# Patient Record
Sex: FEMALE | Race: WHITE | NOT HISPANIC OR LATINO
[De-identification: names, ages, dates, MRNs, and addresses within clinical notes are randomized per-mention and may not be internally consistent; named-entity substitution may affect disease eponyms.]

---

## 2017-08-02 ENCOUNTER — APPOINTMENT (OUTPATIENT)
Dept: CARDIOLOGY | Facility: CLINIC | Age: 67
End: 2017-08-02
Payer: MEDICARE

## 2017-08-02 ENCOUNTER — NON-APPOINTMENT (OUTPATIENT)
Age: 67
End: 2017-08-02

## 2017-08-02 VITALS
SYSTOLIC BLOOD PRESSURE: 145 MMHG | HEIGHT: 66.5 IN | BODY MASS INDEX: 33.35 KG/M2 | DIASTOLIC BLOOD PRESSURE: 80 MMHG | OXYGEN SATURATION: 98 % | HEART RATE: 74 BPM | WEIGHT: 210 LBS

## 2017-08-02 DIAGNOSIS — I65.29 OCCLUSION AND STENOSIS OF UNSPECIFIED CAROTID ARTERY: ICD-10-CM

## 2017-08-02 PROCEDURE — 93000 ELECTROCARDIOGRAM COMPLETE: CPT

## 2017-08-02 PROCEDURE — 99205 OFFICE O/P NEW HI 60 MIN: CPT | Mod: 25

## 2017-08-11 PROBLEM — I65.29 CAROTID STENOSIS: Status: ACTIVE | Noted: 2017-08-11

## 2017-08-13 ENCOUNTER — TRANSCRIPTION ENCOUNTER (OUTPATIENT)
Age: 67
End: 2017-08-13

## 2017-08-16 ENCOUNTER — NON-APPOINTMENT (OUTPATIENT)
Age: 67
End: 2017-08-16

## 2017-08-16 ENCOUNTER — APPOINTMENT (OUTPATIENT)
Dept: CARDIOLOGY | Facility: CLINIC | Age: 67
End: 2017-08-16
Payer: MEDICARE

## 2017-08-16 ENCOUNTER — APPOINTMENT (OUTPATIENT)
Dept: FAMILY MEDICINE | Facility: CLINIC | Age: 67
End: 2017-08-16
Payer: MEDICARE

## 2017-08-16 VITALS
WEIGHT: 211.5 LBS | BODY MASS INDEX: 33.19 KG/M2 | DIASTOLIC BLOOD PRESSURE: 70 MMHG | SYSTOLIC BLOOD PRESSURE: 130 MMHG | HEIGHT: 67 IN | OXYGEN SATURATION: 98 % | HEART RATE: 76 BPM

## 2017-08-16 DIAGNOSIS — Z82.49 FAMILY HISTORY OF ISCHEMIC HEART DISEASE AND OTHER DISEASES OF THE CIRCULATORY SYSTEM: ICD-10-CM

## 2017-08-16 DIAGNOSIS — Z87.891 PERSONAL HISTORY OF NICOTINE DEPENDENCE: ICD-10-CM

## 2017-08-16 DIAGNOSIS — Z83.3 FAMILY HISTORY OF DIABETES MELLITUS: ICD-10-CM

## 2017-08-16 DIAGNOSIS — Z82.69 FAMILY HISTORY OF OTHER DISEASES OF THE MUSCULOSKELETAL SYSTEM AND CONNECTIVE TISSUE: ICD-10-CM

## 2017-08-16 DIAGNOSIS — Z82.3 FAMILY HISTORY OF STROKE: ICD-10-CM

## 2017-08-16 DIAGNOSIS — Z78.9 OTHER SPECIFIED HEALTH STATUS: ICD-10-CM

## 2017-08-16 PROCEDURE — 99204 OFFICE O/P NEW MOD 45 MIN: CPT | Mod: 25

## 2017-08-16 PROCEDURE — 36415 COLL VENOUS BLD VENIPUNCTURE: CPT

## 2017-08-16 PROCEDURE — 93306 TTE W/DOPPLER COMPLETE: CPT

## 2017-08-16 RX ORDER — ASCORBIC ACID 500 MG
TABLET ORAL
Refills: 0 | Status: ACTIVE | COMMUNITY

## 2017-08-16 RX ORDER — CLOPIDOGREL BISULFATE 75 MG/1
75 TABLET, FILM COATED ORAL DAILY
Refills: 0 | Status: DISCONTINUED | COMMUNITY
End: 2017-08-16

## 2017-08-16 RX ORDER — ASPIRIN 81 MG
81 TABLET, DELAYED RELEASE (ENTERIC COATED) ORAL
Refills: 0 | Status: ACTIVE | COMMUNITY

## 2017-08-16 RX ORDER — ADHESIVE TAPE 3"X 2.3 YD
50 MCG TAPE, NON-MEDICATED TOPICAL
Refills: 0 | Status: ACTIVE | COMMUNITY

## 2017-08-16 RX ORDER — GLUC/MSM/COLGN2/HYAL/ANTIARTH3 375-375-20
TABLET ORAL
Refills: 0 | Status: ACTIVE | COMMUNITY

## 2017-08-18 LAB
25(OH)D3 SERPL-MCNC: 31.1 NG/ML
ALBUMIN SERPL ELPH-MCNC: 4.4 G/DL
ALP BLD-CCNC: 73 U/L
ALT SERPL-CCNC: 14 U/L
ANION GAP SERPL CALC-SCNC: 17 MMOL/L
APPEARANCE: ABNORMAL
AST SERPL-CCNC: 20 U/L
BACTERIA: NEGATIVE
BASOPHILS # BLD AUTO: 0.01 K/UL
BASOPHILS NFR BLD AUTO: 0.1 %
BILIRUB SERPL-MCNC: 0.4 MG/DL
BILIRUBIN URINE: NEGATIVE
BLOOD URINE: NEGATIVE
BUN SERPL-MCNC: 36 MG/DL
CALCIUM SERPL-MCNC: 9.8 MG/DL
CHLORIDE SERPL-SCNC: 102 MMOL/L
CHOLEST SERPL-MCNC: 145 MG/DL
CHOLEST/HDLC SERPL: 3.4 RATIO
CO2 SERPL-SCNC: 24 MMOL/L
COLOR: YELLOW
CREAT SERPL-MCNC: 1.24 MG/DL
CREAT SPEC-SCNC: 88 MG/DL
EOSINOPHIL # BLD AUTO: 0.06 K/UL
EOSINOPHIL NFR BLD AUTO: 0.8 %
FOLATE SERPL-MCNC: 15.3 NG/ML
GLUCOSE QUALITATIVE U: NORMAL MG/DL
GLUCOSE SERPL-MCNC: 93 MG/DL
HBA1C MFR BLD HPLC: 5.7 %
HCT VFR BLD CALC: 34.1 %
HDLC SERPL-MCNC: 43 MG/DL
HGB BLD-MCNC: 11.1 G/DL
HYALINE CASTS: 1 /LPF
IMM GRANULOCYTES NFR BLD AUTO: 0.1 %
IRON SATN MFR SERPL: 18 %
IRON SERPL-MCNC: 56 UG/DL
KETONES URINE: NEGATIVE
LDLC SERPL CALC-MCNC: 59 MG/DL
LEUKOCYTE ESTERASE URINE: NEGATIVE
LYMPHOCYTES # BLD AUTO: 2.55 K/UL
LYMPHOCYTES NFR BLD AUTO: 34.4 %
MAN DIFF?: NORMAL
MCHC RBC-ENTMCNC: 29.5 PG
MCHC RBC-ENTMCNC: 32.6 GM/DL
MCV RBC AUTO: 90.7 FL
MICROALBUMIN 24H UR DL<=1MG/L-MCNC: 3 MG/DL
MICROALBUMIN/CREAT 24H UR-RTO: 34 MG/G
MICROSCOPIC-UA: NORMAL
MONOCYTES # BLD AUTO: 0.47 K/UL
MONOCYTES NFR BLD AUTO: 6.3 %
NEUTROPHILS # BLD AUTO: 4.31 K/UL
NEUTROPHILS NFR BLD AUTO: 58.3 %
NITRITE URINE: NEGATIVE
PH URINE: 5
PLATELET # BLD AUTO: 251 K/UL
POTASSIUM SERPL-SCNC: 5.2 MMOL/L
PROT SERPL-MCNC: 8.2 G/DL
PROTEIN URINE: NEGATIVE MG/DL
RBC # BLD: 3.76 M/UL
RBC # FLD: 12.9 %
RED BLOOD CELLS URINE: 1 /HPF
SODIUM SERPL-SCNC: 143 MMOL/L
SPECIFIC GRAVITY URINE: 1.02
SQUAMOUS EPITHELIAL CELLS: 7 /HPF
T4 FREE SERPL-MCNC: 1.2 NG/DL
TIBC SERPL-MCNC: 304 UG/DL
TRIGL SERPL-MCNC: 215 MG/DL
TSH SERPL-ACNC: 2.69 UIU/ML
UIBC SERPL-MCNC: 248 UG/DL
URATE SERPL-MCNC: 8.1 MG/DL
UROBILINOGEN URINE: NORMAL MG/DL
VIT B12 SERPL-MCNC: 382 PG/ML
WBC # FLD AUTO: 7.41 K/UL
WHITE BLOOD CELLS URINE: 4 /HPF

## 2017-09-11 ENCOUNTER — TRANSCRIPTION ENCOUNTER (OUTPATIENT)
Age: 67
End: 2017-09-11

## 2017-09-13 ENCOUNTER — TRANSCRIPTION ENCOUNTER (OUTPATIENT)
Age: 67
End: 2017-09-13

## 2017-11-08 ENCOUNTER — APPOINTMENT (OUTPATIENT)
Dept: FAMILY MEDICINE | Facility: CLINIC | Age: 67
End: 2017-11-08
Payer: MEDICARE

## 2017-11-08 VITALS
HEIGHT: 67 IN | HEART RATE: 77 BPM | WEIGHT: 217 LBS | SYSTOLIC BLOOD PRESSURE: 158 MMHG | OXYGEN SATURATION: 99 % | BODY MASS INDEX: 34.06 KG/M2 | DIASTOLIC BLOOD PRESSURE: 82 MMHG

## 2017-11-08 VITALS — DIASTOLIC BLOOD PRESSURE: 64 MMHG | SYSTOLIC BLOOD PRESSURE: 128 MMHG

## 2017-11-08 DIAGNOSIS — M25.50 PAIN IN UNSPECIFIED JOINT: ICD-10-CM

## 2017-11-08 PROCEDURE — G0439: CPT

## 2017-11-08 PROCEDURE — 36415 COLL VENOUS BLD VENIPUNCTURE: CPT

## 2017-11-08 PROCEDURE — G0009: CPT

## 2017-11-08 PROCEDURE — 90732 PPSV23 VACC 2 YRS+ SUBQ/IM: CPT

## 2017-11-10 LAB
ALBUMIN SERPL ELPH-MCNC: 4.5 G/DL
ALP BLD-CCNC: 87 U/L
ALT SERPL-CCNC: 15 U/L
ANION GAP SERPL CALC-SCNC: 16 MMOL/L
AST SERPL-CCNC: 22 U/L
BASOPHILS # BLD AUTO: 0.02 K/UL
BASOPHILS NFR BLD AUTO: 0.2 %
BILIRUB SERPL-MCNC: 0.4 MG/DL
BUN SERPL-MCNC: 41 MG/DL
CALCIUM SERPL-MCNC: 10.1 MG/DL
CHLORIDE SERPL-SCNC: 102 MMOL/L
CHOLEST SERPL-MCNC: 167 MG/DL
CHOLEST/HDLC SERPL: 3.8 RATIO
CO2 SERPL-SCNC: 23 MMOL/L
CREAT SERPL-MCNC: 1.2 MG/DL
EOSINOPHIL # BLD AUTO: 0.03 K/UL
EOSINOPHIL NFR BLD AUTO: 0.4 %
FOLATE SERPL-MCNC: 19.6 NG/ML
GLUCOSE SERPL-MCNC: 101 MG/DL
HBA1C MFR BLD HPLC: 6.1 %
HCT VFR BLD CALC: 36.2 %
HDLC SERPL-MCNC: 44 MG/DL
HGB BLD-MCNC: 11.8 G/DL
IMM GRANULOCYTES NFR BLD AUTO: 0.1 %
LDLC SERPL CALC-MCNC: 76 MG/DL
LYMPHOCYTES # BLD AUTO: 2.31 K/UL
LYMPHOCYTES NFR BLD AUTO: 28 %
MAN DIFF?: NORMAL
MCHC RBC-ENTMCNC: 30.4 PG
MCHC RBC-ENTMCNC: 32.6 GM/DL
MCV RBC AUTO: 93.3 FL
MONOCYTES # BLD AUTO: 0.45 K/UL
MONOCYTES NFR BLD AUTO: 5.5 %
NEUTROPHILS # BLD AUTO: 5.42 K/UL
NEUTROPHILS NFR BLD AUTO: 65.8 %
PLATELET # BLD AUTO: 249 K/UL
POTASSIUM SERPL-SCNC: 5.1 MMOL/L
PROT SERPL-MCNC: 8.3 G/DL
RBC # BLD: 3.88 M/UL
RBC # FLD: 13.7 %
SODIUM SERPL-SCNC: 141 MMOL/L
TRIGL SERPL-MCNC: 233 MG/DL
URATE SERPL-MCNC: 6.9 MG/DL
VIT B12 SERPL-MCNC: 578 PG/ML
WBC # FLD AUTO: 8.24 K/UL

## 2017-11-10 RX ORDER — METFORMIN HYDROCHLORIDE 1000 MG/1
1000 TABLET, EXTENDED RELEASE ORAL DAILY
Qty: 90 | Refills: 0 | Status: DISCONTINUED | COMMUNITY
End: 2017-11-10

## 2017-11-10 RX ORDER — ALLOPURINOL 300 MG/1
300 TABLET ORAL DAILY
Qty: 90 | Refills: 0 | Status: DISCONTINUED | COMMUNITY
Start: 2017-11-08 | End: 2017-11-10

## 2017-11-14 PROBLEM — M25.50 JOINT PAIN: Status: ACTIVE | Noted: 2017-11-14

## 2017-11-20 ENCOUNTER — TRANSCRIPTION ENCOUNTER (OUTPATIENT)
Age: 67
End: 2017-11-20

## 2017-11-21 ENCOUNTER — TRANSCRIPTION ENCOUNTER (OUTPATIENT)
Age: 67
End: 2017-11-21

## 2017-11-28 ENCOUNTER — TRANSCRIPTION ENCOUNTER (OUTPATIENT)
Age: 67
End: 2017-11-28

## 2018-01-11 ENCOUNTER — FORM ENCOUNTER (OUTPATIENT)
Age: 68
End: 2018-01-11

## 2018-01-12 ENCOUNTER — APPOINTMENT (OUTPATIENT)
Dept: ULTRASOUND IMAGING | Facility: CLINIC | Age: 68
End: 2018-01-12
Payer: MEDICARE

## 2018-01-12 ENCOUNTER — APPOINTMENT (OUTPATIENT)
Dept: MAMMOGRAPHY | Facility: CLINIC | Age: 68
End: 2018-01-12
Payer: MEDICARE

## 2018-01-12 ENCOUNTER — OUTPATIENT (OUTPATIENT)
Dept: OUTPATIENT SERVICES | Facility: HOSPITAL | Age: 68
LOS: 1 days | End: 2018-01-12
Payer: MEDICARE

## 2018-01-12 ENCOUNTER — APPOINTMENT (OUTPATIENT)
Dept: RADIOLOGY | Facility: CLINIC | Age: 68
End: 2018-01-12
Payer: MEDICARE

## 2018-01-12 DIAGNOSIS — M54.9 DORSALGIA, UNSPECIFIED: ICD-10-CM

## 2018-01-12 DIAGNOSIS — E04.1 NONTOXIC SINGLE THYROID NODULE: ICD-10-CM

## 2018-01-12 DIAGNOSIS — Z00.8 ENCOUNTER FOR OTHER GENERAL EXAMINATION: ICD-10-CM

## 2018-01-12 DIAGNOSIS — Z12.31 ENCOUNTER FOR SCREENING MAMMOGRAM FOR MALIGNANT NEOPLASM OF BREAST: ICD-10-CM

## 2018-01-12 PROCEDURE — 72070 X-RAY EXAM THORAC SPINE 2VWS: CPT | Mod: 26

## 2018-01-12 PROCEDURE — 76536 US EXAM OF HEAD AND NECK: CPT | Mod: 26

## 2018-01-12 PROCEDURE — 72114 X-RAY EXAM L-S SPINE BENDING: CPT | Mod: 26

## 2018-01-12 PROCEDURE — 77063 BREAST TOMOSYNTHESIS BI: CPT | Mod: 26

## 2018-01-12 PROCEDURE — 77067 SCR MAMMO BI INCL CAD: CPT | Mod: 26

## 2018-01-12 PROCEDURE — 72114 X-RAY EXAM L-S SPINE BENDING: CPT

## 2018-01-12 PROCEDURE — 72070 X-RAY EXAM THORAC SPINE 2VWS: CPT

## 2018-01-12 PROCEDURE — 77063 BREAST TOMOSYNTHESIS BI: CPT

## 2018-01-12 PROCEDURE — 76536 US EXAM OF HEAD AND NECK: CPT

## 2018-01-12 PROCEDURE — 77067 SCR MAMMO BI INCL CAD: CPT

## 2018-01-15 ENCOUNTER — RESULT REVIEW (OUTPATIENT)
Age: 68
End: 2018-01-15

## 2018-02-02 ENCOUNTER — APPOINTMENT (OUTPATIENT)
Dept: CARDIOLOGY | Facility: CLINIC | Age: 68
End: 2018-02-02
Payer: MEDICARE

## 2018-02-02 ENCOUNTER — NON-APPOINTMENT (OUTPATIENT)
Age: 68
End: 2018-02-02

## 2018-02-02 VITALS
HEART RATE: 67 BPM | OXYGEN SATURATION: 99 % | BODY MASS INDEX: 35.16 KG/M2 | WEIGHT: 224 LBS | SYSTOLIC BLOOD PRESSURE: 165 MMHG | DIASTOLIC BLOOD PRESSURE: 81 MMHG | HEIGHT: 67 IN

## 2018-02-02 PROCEDURE — 93000 ELECTROCARDIOGRAM COMPLETE: CPT

## 2018-02-02 PROCEDURE — 99214 OFFICE O/P EST MOD 30 MIN: CPT | Mod: 25

## 2018-02-02 RX ORDER — METFORMIN HYDROCHLORIDE 500 MG/1
500 TABLET, COATED ORAL TWICE DAILY
Qty: 180 | Refills: 1 | Status: DISCONTINUED | COMMUNITY
Start: 2017-11-10 | End: 2018-02-02

## 2018-02-21 ENCOUNTER — APPOINTMENT (OUTPATIENT)
Dept: FAMILY MEDICINE | Facility: CLINIC | Age: 68
End: 2018-02-21
Payer: MEDICARE

## 2018-02-21 VITALS
DIASTOLIC BLOOD PRESSURE: 60 MMHG | OXYGEN SATURATION: 98 % | RESPIRATION RATE: 16 BRPM | SYSTOLIC BLOOD PRESSURE: 120 MMHG | HEART RATE: 77 BPM

## 2018-02-21 VITALS
HEIGHT: 67 IN | SYSTOLIC BLOOD PRESSURE: 142 MMHG | WEIGHT: 224 LBS | BODY MASS INDEX: 35.16 KG/M2 | DIASTOLIC BLOOD PRESSURE: 62 MMHG

## 2018-02-21 PROCEDURE — 99214 OFFICE O/P EST MOD 30 MIN: CPT

## 2018-02-21 RX ORDER — CYANOCOBALAMIN (VITAMIN B-12) 100 MCG
100 TABLET ORAL
Refills: 0 | Status: ACTIVE | COMMUNITY

## 2018-03-02 ENCOUNTER — TRANSCRIPTION ENCOUNTER (OUTPATIENT)
Age: 68
End: 2018-03-02

## 2018-03-06 ENCOUNTER — RX RENEWAL (OUTPATIENT)
Age: 68
End: 2018-03-06

## 2018-03-09 ENCOUNTER — APPOINTMENT (OUTPATIENT)
Dept: FAMILY MEDICINE | Facility: CLINIC | Age: 68
End: 2018-03-09
Payer: MEDICARE

## 2018-03-09 VITALS
BODY MASS INDEX: 34.73 KG/M2 | WEIGHT: 221.25 LBS | DIASTOLIC BLOOD PRESSURE: 78 MMHG | HEIGHT: 67 IN | OXYGEN SATURATION: 98 % | SYSTOLIC BLOOD PRESSURE: 140 MMHG | HEART RATE: 89 BPM | TEMPERATURE: 98.5 F

## 2018-03-09 PROCEDURE — 99214 OFFICE O/P EST MOD 30 MIN: CPT

## 2018-03-13 ENCOUNTER — TRANSCRIPTION ENCOUNTER (OUTPATIENT)
Age: 68
End: 2018-03-13

## 2018-03-14 ENCOUNTER — TRANSCRIPTION ENCOUNTER (OUTPATIENT)
Age: 68
End: 2018-03-14

## 2018-04-09 ENCOUNTER — RX RENEWAL (OUTPATIENT)
Age: 68
End: 2018-04-09

## 2018-04-09 ENCOUNTER — RESULT REVIEW (OUTPATIENT)
Age: 68
End: 2018-04-09

## 2018-04-09 ENCOUNTER — TRANSCRIPTION ENCOUNTER (OUTPATIENT)
Age: 68
End: 2018-04-09

## 2018-04-10 ENCOUNTER — TRANSCRIPTION ENCOUNTER (OUTPATIENT)
Age: 68
End: 2018-04-10

## 2018-05-11 ENCOUNTER — RX RENEWAL (OUTPATIENT)
Age: 68
End: 2018-05-11

## 2018-05-23 ENCOUNTER — APPOINTMENT (OUTPATIENT)
Dept: FAMILY MEDICINE | Facility: CLINIC | Age: 68
End: 2018-05-23
Payer: MEDICARE

## 2018-05-23 VITALS
BODY MASS INDEX: 36.1 KG/M2 | OXYGEN SATURATION: 98 % | HEIGHT: 67 IN | WEIGHT: 230 LBS | HEART RATE: 87 BPM | SYSTOLIC BLOOD PRESSURE: 140 MMHG | DIASTOLIC BLOOD PRESSURE: 70 MMHG

## 2018-05-23 VITALS — SYSTOLIC BLOOD PRESSURE: 130 MMHG | RESPIRATION RATE: 16 BRPM | DIASTOLIC BLOOD PRESSURE: 70 MMHG

## 2018-05-23 DIAGNOSIS — Z87.09 PERSONAL HISTORY OF OTHER DISEASES OF THE RESPIRATORY SYSTEM: ICD-10-CM

## 2018-05-23 DIAGNOSIS — Z12.11 ENCOUNTER FOR SCREENING FOR MALIGNANT NEOPLASM OF COLON: ICD-10-CM

## 2018-05-23 DIAGNOSIS — D64.9 ANEMIA, UNSPECIFIED: ICD-10-CM

## 2018-05-23 PROCEDURE — 36415 COLL VENOUS BLD VENIPUNCTURE: CPT

## 2018-05-23 PROCEDURE — G0444 DEPRESSION SCREEN ANNUAL: CPT | Mod: 59

## 2018-05-23 PROCEDURE — 99214 OFFICE O/P EST MOD 30 MIN: CPT | Mod: 25

## 2018-05-23 RX ORDER — BENZONATATE 200 MG/1
200 CAPSULE ORAL
Qty: 30 | Refills: 0 | Status: DISCONTINUED | COMMUNITY
Start: 2018-03-06 | End: 2018-05-23

## 2018-05-23 RX ORDER — ATORVASTATIN CALCIUM 40 MG/1
40 TABLET, FILM COATED ORAL
Qty: 90 | Refills: 0 | Status: DISCONTINUED | COMMUNITY
Start: 2017-01-13

## 2018-05-23 RX ORDER — AMOXICILLIN AND CLAVULANATE POTASSIUM 875; 125 MG/1; MG/1
875-125 TABLET, COATED ORAL
Qty: 14 | Refills: 0 | Status: DISCONTINUED | COMMUNITY
Start: 2018-03-09 | End: 2018-05-23

## 2018-05-23 NOTE — COUNSELING
[Healthy eating counseling provided] : healthy eating [Activity counseling provided] : activity [Patient motivation] : Patient motivation [Good understanding] : Patient has a good understanding of lifestyle changes and the steps needed to achieve self management goals [ - Annual Depression Screening] : Annual Depression Screening

## 2018-05-23 NOTE — HISTORY OF PRESENT ILLNESS
[FreeTextEntry1] : Follow up [de-identified] : \par 67 year old female presents for follow up. \par \par Has CAD s/p CABG (3 vessel) in 2016, follows with cardiology \par \par PVD s/p LLE stent\par \par Has DM II, last HgA1C 6.1, currently on Metformin 500 mg daily\par Did not do blood work ordered previously, will check today \par sugars in morning running average 140s \par \par HTN, on Metoprolol \par \par Hyperlipidemia, on Atorvastatin \par \par H/o Gout, on Allopurinol \par \par Thyroid ultrasound done Jan 2018 showed multiple benign appearing right thyroid nodules and cysts\par has not seen endocrinology yet \par \par concerned about Obesity, unable to lose weight\par has gone to the gym, karla, didn't lose weight\par noticed in the past when she followed with a nutritionist she was able to lose weight\par typically breakfast is an egg or cereal with milk, fruit, lunch later in the afternoon can be a sandwich, dinner later on may vary, chicken, vegetables, potato, occasionally shrimp, pizza

## 2018-05-23 NOTE — PHYSICAL EXAM
[No Acute Distress] : no acute distress [Well Nourished] : well nourished [Well Developed] : well developed [Normal Oropharynx] : the oropharynx was normal [Normal TMs] : both tympanic membranes were normal [Normal Appearance] : was normal in appearance [Neck Supple] : was supple [No Respiratory Distress] : no respiratory distress  [Clear to Auscultation] : lungs were clear to auscultation bilaterally [Normal Rhythm/Effort] : normal respiratory rhythm and effort [Normal Rate] : normal rate  [Regular Rhythm] : with a regular rhythm [Normal S1, S2] : normal S1 and S2 [No Murmur] : no murmur heard [No Carotid Bruits] : no carotid bruits [No Edema] : there was no peripheral edema [Soft] : abdomen soft [Non Tender] : non-tender [Normal Bowel Sounds] : normal bowel sounds [Normal Anterior Cervical Nodes] : no anterior cervical lymphadenopathy [Alert and Oriented x3] : oriented to person, place, and time [Well-Appearing] : well-appearing [Obese] : obese [Grossly Normal Strength/Tone] : grossly normal strength/tone [No Focal Deficits] : no focal deficits [Normal Affect] : the affect was normal [Normal Mood] : the mood was normal [de-identified] : Obese [de-identified] : no thyromegaly

## 2018-05-23 NOTE — REVIEW OF SYSTEMS
[Fever] : no fever [Chills] : no chills [Sore Throat] : no sore throat [Chest Pain] : no chest pain [Palpitations] : no palpitations [Shortness Of Breath] : no shortness of breath [Cough] : no cough [Abdominal Pain] : no abdominal pain

## 2018-05-23 NOTE — ASSESSMENT
[FreeTextEntry1] : DM II:\par last HgA1C 6.1, check blood work\par c/w Metformin for now, if renal function worsens, will look to switch medication\par focus on diet and exercise as tolerated \par \par HTN:\par stable, c/w Metoprolol \par \par Hyperlipidemia:\par previously stable\par c/w Atorvastatin\par c/w diet and exercise as tolerated \par check blood work \par \par CKD:\par check blood work, reassess \par \par CAD:\par stable, c/w current medications\par f/u with cardiology when due\par \par Thyroid nodules:\par advised to f/u with endocrinology \par \par Obesity:\par c/w diet and exercise as tolerated\par f/u with nutritionist\par see endocrinology\par \par up to date with mammogram\par do stool study (fecal globin), declines colonoscopy \par \par return as needed and follow up in 3 months

## 2018-05-29 LAB
25(OH)D3 SERPL-MCNC: 28.7 NG/ML
ALBUMIN SERPL ELPH-MCNC: 4.7 G/DL
ALP BLD-CCNC: 99 U/L
ALT SERPL-CCNC: 17 U/L
ANION GAP SERPL CALC-SCNC: 15 MMOL/L
APPEARANCE: CLEAR
AST SERPL-CCNC: 22 U/L
BACTERIA: NEGATIVE
BASOPHILS # BLD AUTO: 0.02 K/UL
BASOPHILS NFR BLD AUTO: 0.3 %
BILIRUB SERPL-MCNC: 0.2 MG/DL
BILIRUBIN URINE: NEGATIVE
BLOOD URINE: NEGATIVE
BUN SERPL-MCNC: 33 MG/DL
CALCIUM SERPL-MCNC: 10.1 MG/DL
CHLORIDE SERPL-SCNC: 103 MMOL/L
CHOLEST SERPL-MCNC: 182 MG/DL
CHOLEST/HDLC SERPL: 3.9 RATIO
CO2 SERPL-SCNC: 23 MMOL/L
COLOR: YELLOW
CREAT SERPL-MCNC: 1.1 MG/DL
CREAT SPEC-SCNC: 98 MG/DL
EOSINOPHIL # BLD AUTO: 0.07 K/UL
EOSINOPHIL NFR BLD AUTO: 1 %
FOLATE SERPL-MCNC: 18.9 NG/ML
GLUCOSE QUALITATIVE U: NEGATIVE MG/DL
GLUCOSE SERPL-MCNC: 126 MG/DL
HBA1C MFR BLD HPLC: 6.8 %
HCT VFR BLD CALC: 36.5 %
HDLC SERPL-MCNC: 47 MG/DL
HGB BLD-MCNC: 11.6 G/DL
HYALINE CASTS: 2 /LPF
IMM GRANULOCYTES NFR BLD AUTO: 0.3 %
KETONES URINE: NEGATIVE
LDLC SERPL CALC-MCNC: 82 MG/DL
LEUKOCYTE ESTERASE URINE: NEGATIVE
LYMPHOCYTES # BLD AUTO: 2.42 K/UL
LYMPHOCYTES NFR BLD AUTO: 36.2 %
MAN DIFF?: NORMAL
MCHC RBC-ENTMCNC: 29.9 PG
MCHC RBC-ENTMCNC: 31.8 GM/DL
MCV RBC AUTO: 94.1 FL
MICROALBUMIN 24H UR DL<=1MG/L-MCNC: 22.2 MG/DL
MICROALBUMIN/CREAT 24H UR-RTO: 227 MG/G
MICROSCOPIC-UA: NORMAL
MONOCYTES # BLD AUTO: 0.3 K/UL
MONOCYTES NFR BLD AUTO: 4.5 %
NEUTROPHILS # BLD AUTO: 3.85 K/UL
NEUTROPHILS NFR BLD AUTO: 57.7 %
NITRITE URINE: NEGATIVE
PH URINE: 5.5
PLATELET # BLD AUTO: 280 K/UL
POTASSIUM SERPL-SCNC: 4.9 MMOL/L
PROT SERPL-MCNC: 8.1 G/DL
PROTEIN URINE: 30 MG/DL
RBC # BLD: 3.88 M/UL
RBC # FLD: 13.7 %
RED BLOOD CELLS URINE: 3 /HPF
SODIUM SERPL-SCNC: 141 MMOL/L
SPECIFIC GRAVITY URINE: 1.02
SQUAMOUS EPITHELIAL CELLS: 10 /HPF
T4 FREE SERPL-MCNC: 1.2 NG/DL
TRIGL SERPL-MCNC: 265 MG/DL
TSH SERPL-ACNC: 2.53 UIU/ML
URATE SERPL-MCNC: 6.7 MG/DL
UROBILINOGEN URINE: 1 MG/DL
VIT B12 SERPL-MCNC: 954 PG/ML
WBC # FLD AUTO: 6.68 K/UL
WHITE BLOOD CELLS URINE: 1 /HPF

## 2018-06-05 LAB — HEMOCCULT STL QL IA: NEGATIVE

## 2018-06-30 ENCOUNTER — TRANSCRIPTION ENCOUNTER (OUTPATIENT)
Age: 68
End: 2018-06-30

## 2018-07-02 ENCOUNTER — TRANSCRIPTION ENCOUNTER (OUTPATIENT)
Age: 68
End: 2018-07-02

## 2018-07-03 ENCOUNTER — TRANSCRIPTION ENCOUNTER (OUTPATIENT)
Age: 68
End: 2018-07-03

## 2018-07-13 ENCOUNTER — TRANSCRIPTION ENCOUNTER (OUTPATIENT)
Age: 68
End: 2018-07-13

## 2018-08-01 ENCOUNTER — APPOINTMENT (OUTPATIENT)
Dept: FAMILY MEDICINE | Facility: CLINIC | Age: 68
End: 2018-08-01
Payer: MEDICARE

## 2018-08-01 VITALS — RESPIRATION RATE: 16 BRPM | DIASTOLIC BLOOD PRESSURE: 60 MMHG | SYSTOLIC BLOOD PRESSURE: 130 MMHG

## 2018-08-01 VITALS
HEART RATE: 82 BPM | BODY MASS INDEX: 36.63 KG/M2 | HEIGHT: 67 IN | SYSTOLIC BLOOD PRESSURE: 150 MMHG | WEIGHT: 233.38 LBS | OXYGEN SATURATION: 97 % | DIASTOLIC BLOOD PRESSURE: 78 MMHG

## 2018-08-01 PROCEDURE — 99214 OFFICE O/P EST MOD 30 MIN: CPT

## 2018-08-01 RX ORDER — LOSARTAN POTASSIUM 25 MG/1
25 TABLET, FILM COATED ORAL DAILY
Qty: 90 | Refills: 1 | Status: DISCONTINUED | COMMUNITY
Start: 2018-05-29 | End: 2018-08-01

## 2018-08-02 ENCOUNTER — RX RENEWAL (OUTPATIENT)
Age: 68
End: 2018-08-02

## 2018-08-02 ENCOUNTER — TRANSCRIPTION ENCOUNTER (OUTPATIENT)
Age: 68
End: 2018-08-02

## 2018-08-03 ENCOUNTER — NON-APPOINTMENT (OUTPATIENT)
Age: 68
End: 2018-08-03

## 2018-08-03 ENCOUNTER — APPOINTMENT (OUTPATIENT)
Dept: CARDIOLOGY | Facility: CLINIC | Age: 68
End: 2018-08-03
Payer: MEDICARE

## 2018-08-03 VITALS
DIASTOLIC BLOOD PRESSURE: 80 MMHG | HEART RATE: 74 BPM | HEIGHT: 67 IN | OXYGEN SATURATION: 97 % | SYSTOLIC BLOOD PRESSURE: 134 MMHG | WEIGHT: 232 LBS | BODY MASS INDEX: 36.41 KG/M2

## 2018-08-03 DIAGNOSIS — R53.83 OTHER FATIGUE: ICD-10-CM

## 2018-08-03 PROCEDURE — 99214 OFFICE O/P EST MOD 30 MIN: CPT | Mod: 25

## 2018-08-03 PROCEDURE — 93000 ELECTROCARDIOGRAM COMPLETE: CPT

## 2018-08-07 NOTE — PHYSICAL EXAM
[No Acute Distress] : no acute distress [Well Nourished] : well nourished [Well Developed] : well developed [Well-Appearing] : well-appearing [Normal Oropharynx] : the oropharynx was normal [Normal Appearance] : was normal in appearance [Neck Supple] : was supple [No Respiratory Distress] : no respiratory distress  [Clear to Auscultation] : lungs were clear to auscultation bilaterally [Normal Rhythm/Effort] : normal respiratory rhythm and effort [Normal Rate] : normal rate  [Regular Rhythm] : with a regular rhythm [Normal S1, S2] : normal S1 and S2 [No Edema] : there was no peripheral edema [Soft] : abdomen soft [Non Tender] : non-tender [Normal Posterior Cervical Nodes] : no posterior cervical lymphadenopathy [Normal Anterior Cervical Nodes] : no anterior cervical lymphadenopathy [No Rash] : no rash [No Focal Deficits] : no focal deficits [Normal Affect] : the affect was normal [Alert and Oriented x3] : oriented to person, place, and time [Normal Mood] : the mood was normal [de-identified] : Obese

## 2018-08-07 NOTE — REVIEW OF SYSTEMS
[Fever] : no fever [Chills] : no chills [Chest Pain] : no chest pain [Palpitations] : no palpitations [Shortness Of Breath] : no shortness of breath [Cough] : no cough [Abdominal Pain] : no abdominal pain

## 2018-08-07 NOTE — HISTORY OF PRESENT ILLNESS
[FreeTextEntry1] : Follow up [de-identified] : \par 68 year old female presents for follow up. \par \par Has CAD s/p CABG (3 vessel) in 2016, follows with cardiology \par Has upcoming stress test scheduled\par \par PVD s/p LLE stent\par \par Has DM II, last HgA1C 6.8, currently on Metformin 500 mg daily\par Stopped Losartan as she became tearful and started crying excessively, symptoms stopped after stopping the medication last week\par \par HTN, on Metoprolol \par \par Hyperlipidemia, on Atorvastatin \par \par H/o Gout, on Allopurinol \par \par Thyroid ultrasound done Jan 2018 showed multiple benign appearing right thyroid nodules and cysts\par has not seen endocrinology yet

## 2018-08-22 ENCOUNTER — APPOINTMENT (OUTPATIENT)
Dept: CARDIOLOGY | Facility: CLINIC | Age: 68
End: 2018-08-22
Payer: MEDICARE

## 2018-08-22 PROCEDURE — 93017 CV STRESS TEST TRACING ONLY: CPT

## 2018-08-22 PROCEDURE — 78452 HT MUSCLE IMAGE SPECT MULT: CPT

## 2018-08-22 PROCEDURE — 93018 CV STRESS TEST I&R ONLY: CPT

## 2018-08-22 PROCEDURE — A9500: CPT

## 2018-08-27 ENCOUNTER — RESULT REVIEW (OUTPATIENT)
Age: 68
End: 2018-08-27

## 2018-09-05 ENCOUNTER — TRANSCRIPTION ENCOUNTER (OUTPATIENT)
Age: 68
End: 2018-09-05

## 2018-09-14 ENCOUNTER — OTHER (OUTPATIENT)
Age: 68
End: 2018-09-14

## 2018-09-30 ENCOUNTER — RX RENEWAL (OUTPATIENT)
Age: 68
End: 2018-09-30

## 2018-10-04 ENCOUNTER — RX RENEWAL (OUTPATIENT)
Age: 68
End: 2018-10-04

## 2018-10-05 RX ORDER — METFORMIN HYDROCHLORIDE 500 MG/1
500 TABLET, FILM COATED, EXTENDED RELEASE ORAL
Qty: 90 | Refills: 0 | Status: DISCONTINUED | COMMUNITY
End: 2018-10-05

## 2018-10-10 ENCOUNTER — TRANSCRIPTION ENCOUNTER (OUTPATIENT)
Age: 68
End: 2018-10-10

## 2018-10-15 ENCOUNTER — TRANSCRIPTION ENCOUNTER (OUTPATIENT)
Age: 68
End: 2018-10-15

## 2018-10-15 ENCOUNTER — RX RENEWAL (OUTPATIENT)
Age: 68
End: 2018-10-15

## 2018-10-19 ENCOUNTER — TRANSCRIPTION ENCOUNTER (OUTPATIENT)
Age: 68
End: 2018-10-19

## 2018-10-22 ENCOUNTER — TRANSCRIPTION ENCOUNTER (OUTPATIENT)
Age: 68
End: 2018-10-22

## 2018-10-31 ENCOUNTER — RX RENEWAL (OUTPATIENT)
Age: 68
End: 2018-10-31

## 2018-10-31 ENCOUNTER — TRANSCRIPTION ENCOUNTER (OUTPATIENT)
Age: 68
End: 2018-10-31

## 2018-11-07 ENCOUNTER — RX RENEWAL (OUTPATIENT)
Age: 68
End: 2018-11-07

## 2018-11-21 ENCOUNTER — RX RENEWAL (OUTPATIENT)
Age: 68
End: 2018-11-21

## 2018-11-30 ENCOUNTER — APPOINTMENT (OUTPATIENT)
Dept: FAMILY MEDICINE | Facility: CLINIC | Age: 68
End: 2018-11-30
Payer: MEDICARE

## 2018-11-30 VITALS
SYSTOLIC BLOOD PRESSURE: 170 MMHG | DIASTOLIC BLOOD PRESSURE: 72 MMHG | HEART RATE: 74 BPM | OXYGEN SATURATION: 95 % | WEIGHT: 226.25 LBS | BODY MASS INDEX: 35.51 KG/M2 | HEIGHT: 67 IN

## 2018-11-30 VITALS — RESPIRATION RATE: 16 BRPM | SYSTOLIC BLOOD PRESSURE: 138 MMHG | DIASTOLIC BLOOD PRESSURE: 60 MMHG

## 2018-11-30 DIAGNOSIS — Z12.31 ENCOUNTER FOR SCREENING MAMMOGRAM FOR MALIGNANT NEOPLASM OF BREAST: ICD-10-CM

## 2018-11-30 PROCEDURE — 99214 OFFICE O/P EST MOD 30 MIN: CPT | Mod: 25

## 2018-11-30 PROCEDURE — G0439: CPT

## 2018-12-03 NOTE — PHYSICAL EXAM
[No Acute Distress] : no acute distress [Well Nourished] : well nourished [Well Developed] : well developed [Well-Appearing] : well-appearing [Normal Sclera/Conjunctiva] : normal sclera/conjunctiva [PERRL] : pupils equal round and reactive to light [Normal Oropharynx] : the oropharynx was normal [Normal TMs] : both tympanic membranes were normal [Normal Appearance] : was normal in appearance [Neck Supple] : was supple [Normal] : the thyroid was normal [No Respiratory Distress] : no respiratory distress  [Clear to Auscultation] : lungs were clear to auscultation bilaterally [Normal Rate] : normal rate  [Regular Rhythm] : with a regular rhythm [Normal S1, S2] : normal S1 and S2 [No Murmur] : no murmur heard [No Carotid Bruits] : no carotid bruits [No Edema] : there was no peripheral edema [Soft] : abdomen soft [Non Tender] : non-tender [Normal Bowel Sounds] : normal bowel sounds [Obese] : obese [Normal Anterior Cervical Nodes] : no anterior cervical lymphadenopathy [No Spinal Tenderness] : no spinal tenderness [Grossly Normal Strength/Tone] : grossly normal strength/tone [No Rash] : no rash [No Focal Deficits] : no focal deficits [Normal Affect] : the affect was normal [Alert and Oriented x3] : oriented to person, place, and time [Normal Mood] : the mood was normal [Right Foot Was Examined] : Right foot ~C was examined [Left Foot Was Examined] : left foot ~C was examined [None] : no ulcers in either foot were found [de-identified] : Obese [de-identified] : diminished pedal pulses bilaterally [de-identified] : multiple nevi on back, cherry angiomas

## 2018-12-03 NOTE — ASSESSMENT
[FreeTextEntry1] : Health care maintenance:\par check blood work \par follow up with optometry/ophthalmology and dentist for routine exams\par go for mammogram next month\par declines colonoscopy, up to date with fecal globin\par c/w diet and exercise as tolerated\par declines Flu vaccine\par up to date with Pneumonia vaccinations \par \par DM II:\par on Metformin 500 mg daily\par c/w diet and exercise\par up to date with ophthalmology \par check blood work\par \par HTN:\par stable\par c/w Metoprolol, Lisinopril \par \par Hyperlipidemia:\par c/w Atorvastatin\par c/w diet and exercise\par check blood work\par \par CKD:\par check blood work, reassess \par \par PVD:\par f/u with vascular surgery, cardiology\par \par CAD:\par c/w current medications\par f/u with cardiology\par \par Thyroid nodules:\par check follow up thyroid ultrasound next month\par reassess\par \par Obesity:\par c/w diet and exercise

## 2018-12-03 NOTE — HEALTH RISK ASSESSMENT
[No falls in past year] : Patient reported no falls in the past year [0] : 2) Feeling down, depressed, or hopeless: Not at all (0) [Patient reported mammogram was normal] : Patient reported mammogram was normal [Patient declined colonoscopy] : Patient declined colonoscopy [HIV test declined] : HIV test declined [Hepatitis C test offered] : Hepatitis C test offered [With Family] : lives with family [Fully functional (bathing, dressing, toileting, transferring, walking, feeding)] : Fully functional (bathing, dressing, toileting, transferring, walking, feeding) [Independent] : managing finances [Some assistance needed] : housekeeping [Smoke Detector] : smoke detector [Carbon Monoxide Detector] : carbon monoxide detector [Seat Belt] :  uses seat belt [Change in mental status noted] : No change in mental status noted [Reports changes in hearing] : Reports no changes in hearing [Reports changes in vision] : Reports no changes in vision [MammogramDate] : 01/18 [BoneDensityComments] : Reports previous bone density, will look for results  [de-identified] : Son [de-identified] : follows with ophthalmology, Dr. Waggoner  [Discussed at today's visit] : Advance Directives Discussed at today's visit [FreeTextEntry4] : Will consider setting a proxy

## 2018-12-03 NOTE — HISTORY OF PRESENT ILLNESS
[FreeTextEntry1] : Annual physical  [de-identified] : \par 68 year old female presents for annual physical\par \par Has CAD s/p CABG (3 vessel) in 2016, follows with cardiology \par \par PVD s/p LLE stent\par \par Has DM II, last HgA1C 6.9, currently on Metformin 500 mg daily\par On Lisinopril \par \par HTN, on Metoprolol, Lisinopril \par \par Hyperlipidemia, on Atorvastatin \par \par H/o Gout, on Allopurinol \par \par Thyroid ultrasound done Jan 2018 showed multiple benign appearing right thyroid nodules and cysts\par Did not see endocrinology

## 2018-12-04 LAB
ALBUMIN SERPL ELPH-MCNC: 4.8 G/DL
ALP BLD-CCNC: 94 U/L
ALT SERPL-CCNC: 15 U/L
ANION GAP SERPL CALC-SCNC: 14 MMOL/L
APPEARANCE: CLEAR
AST SERPL-CCNC: 19 U/L
BACTERIA: ABNORMAL
BASOPHILS # BLD AUTO: 0.01 K/UL
BASOPHILS NFR BLD AUTO: 0.2 %
BILIRUB SERPL-MCNC: 0.3 MG/DL
BILIRUBIN URINE: NEGATIVE
BLOOD URINE: NEGATIVE
BUN SERPL-MCNC: 46 MG/DL
CALCIUM SERPL-MCNC: 9.9 MG/DL
CHLORIDE SERPL-SCNC: 104 MMOL/L
CHOLEST SERPL-MCNC: 201 MG/DL
CHOLEST/HDLC SERPL: 5 RATIO
CO2 SERPL-SCNC: 22 MMOL/L
COLOR: YELLOW
CREAT SERPL-MCNC: 1.5 MG/DL
CREAT SPEC-SCNC: 65 MG/DL
EOSINOPHIL # BLD AUTO: 0.05 K/UL
EOSINOPHIL NFR BLD AUTO: 0.8 %
FERRITIN SERPL-MCNC: 151 NG/ML
GLUCOSE QUALITATIVE U: NEGATIVE MG/DL
GLUCOSE SERPL-MCNC: 141 MG/DL
HBA1C MFR BLD HPLC: 6.8 %
HCT VFR BLD CALC: 35.4 %
HCV AB SER QL: NONREACTIVE
HCV S/CO RATIO: 0.13 S/CO
HDLC SERPL-MCNC: 40 MG/DL
HGB BLD-MCNC: 11.5 G/DL
IMM GRANULOCYTES NFR BLD AUTO: 0.5 %
IRON SATN MFR SERPL: 18 %
IRON SERPL-MCNC: 66 UG/DL
KETONES URINE: NEGATIVE
LDLC SERPL CALC-MCNC: 95 MG/DL
LEUKOCYTE ESTERASE URINE: NEGATIVE
LYMPHOCYTES # BLD AUTO: 1.89 K/UL
LYMPHOCYTES NFR BLD AUTO: 29.4 %
MAN DIFF?: NORMAL
MCHC RBC-ENTMCNC: 30.1 PG
MCHC RBC-ENTMCNC: 32.5 GM/DL
MCV RBC AUTO: 92.7 FL
MICROALBUMIN 24H UR DL<=1MG/L-MCNC: 21.6 MG/DL
MICROALBUMIN/CREAT 24H UR-RTO: 331 MG/G
MICROSCOPIC-UA: NORMAL
MONOCYTES # BLD AUTO: 0.38 K/UL
MONOCYTES NFR BLD AUTO: 5.9 %
NEUTROPHILS # BLD AUTO: 4.06 K/UL
NEUTROPHILS NFR BLD AUTO: 63.2 %
NITRITE URINE: NEGATIVE
PH URINE: 5
PLATELET # BLD AUTO: 253 K/UL
POTASSIUM SERPL-SCNC: 5 MMOL/L
PROT SERPL-MCNC: 8.1 G/DL
PROTEIN URINE: 30 MG/DL
RBC # BLD: 3.82 M/UL
RBC # FLD: 13.2 %
RED BLOOD CELLS URINE: 1 /HPF
SODIUM SERPL-SCNC: 140 MMOL/L
SPECIFIC GRAVITY URINE: 1.02
SQUAMOUS EPITHELIAL CELLS: 1 /HPF
TIBC SERPL-MCNC: 359 UG/DL
TRIGL SERPL-MCNC: 328 MG/DL
TSH SERPL-ACNC: 2.69 UIU/ML
UIBC SERPL-MCNC: 293 UG/DL
URATE SERPL-MCNC: 8.2 MG/DL
UROBILINOGEN URINE: NEGATIVE MG/DL
WBC # FLD AUTO: 6.42 K/UL
WHITE BLOOD CELLS URINE: 1 /HPF

## 2018-12-21 ENCOUNTER — RX RENEWAL (OUTPATIENT)
Age: 68
End: 2018-12-21

## 2018-12-21 RX ORDER — METFORMIN ER 500 MG 500 MG/1
500 TABLET ORAL DAILY
Qty: 90 | Refills: 0 | Status: DISCONTINUED | COMMUNITY
Start: 2018-10-04 | End: 2018-12-21

## 2019-01-21 ENCOUNTER — RX RENEWAL (OUTPATIENT)
Age: 69
End: 2019-01-21

## 2019-02-08 ENCOUNTER — TRANSCRIPTION ENCOUNTER (OUTPATIENT)
Age: 69
End: 2019-02-08

## 2019-02-27 ENCOUNTER — APPOINTMENT (OUTPATIENT)
Dept: FAMILY MEDICINE | Facility: CLINIC | Age: 69
End: 2019-02-27
Payer: MEDICARE

## 2019-02-27 VITALS — SYSTOLIC BLOOD PRESSURE: 120 MMHG | RESPIRATION RATE: 16 BRPM | DIASTOLIC BLOOD PRESSURE: 70 MMHG

## 2019-02-27 VITALS
HEART RATE: 75 BPM | BODY MASS INDEX: 36.47 KG/M2 | OXYGEN SATURATION: 98 % | WEIGHT: 232.38 LBS | DIASTOLIC BLOOD PRESSURE: 70 MMHG | HEIGHT: 67 IN | SYSTOLIC BLOOD PRESSURE: 146 MMHG

## 2019-02-27 DIAGNOSIS — G89.29 LUMBAGO WITH SCIATICA, LEFT SIDE: ICD-10-CM

## 2019-02-27 DIAGNOSIS — M54.42 LUMBAGO WITH SCIATICA, LEFT SIDE: ICD-10-CM

## 2019-02-27 DIAGNOSIS — M10.9 GOUT, UNSPECIFIED: ICD-10-CM

## 2019-02-27 PROCEDURE — G0444 DEPRESSION SCREEN ANNUAL: CPT | Mod: 59

## 2019-02-27 PROCEDURE — 99214 OFFICE O/P EST MOD 30 MIN: CPT | Mod: 25

## 2019-02-27 RX ORDER — LISINOPRIL 2.5 MG/1
2.5 TABLET ORAL DAILY
Qty: 90 | Refills: 0 | Status: DISCONTINUED | COMMUNITY
Start: 2018-08-01 | End: 2019-02-27

## 2019-02-27 NOTE — PHYSICAL EXAM
[No Acute Distress] : no acute distress [Well Nourished] : well nourished [Well Developed] : well developed [Well-Appearing] : well-appearing [Normal Appearance] : was normal in appearance [Neck Supple] : was supple [No Respiratory Distress] : no respiratory distress  [Clear to Auscultation] : lungs were clear to auscultation bilaterally [Normal Rate] : normal rate  [Regular Rhythm] : with a regular rhythm [Normal S1, S2] : normal S1 and S2 [No Murmur] : no murmur heard [No Carotid Bruits] : no carotid bruits [Pedal Pulses Present] : the pedal pulses are present [No Edema] : there was no peripheral edema [Soft] : abdomen soft [Non Tender] : non-tender [Normal Posterior Cervical Nodes] : no posterior cervical lymphadenopathy [Normal Anterior Cervical Nodes] : no anterior cervical lymphadenopathy [Normal Affect] : the affect was normal [Alert and Oriented x3] : oriented to person, place, and time [Normal Mood] : the mood was normal [Normal Oropharynx] : the oropharynx was normal [Normal TMs] : both tympanic membranes were normal [de-identified] : Obese

## 2019-02-27 NOTE — HISTORY OF PRESENT ILLNESS
[FreeTextEntry1] : Follow up [de-identified] : \par 68 year old female presents for follow up\par \par Has CAD s/p CABG (3 vessel) in 2016, follows with cardiology \par \par PVD s/p LLE stent\par \par Has DM II, last HgA1C 6.8 from Nov 2018\par Off of Metformin and Lisinopril as renal function worsened on medications \par On Glimepiride, doing well on medication, sugars 150s in the am, 70s-90s in the afternoon \par \par HTN, on Metoprolol\par \par Hyperlipidemia, on Atorvastatin \par \par H/o Gout, on Allopurinol \par \par Has worsening lower back pain radiating down her left leg at times\par Reports pain worsens after walking and will have to stop after a short while to alleviate the pain\par Will often have to hold onto a shopping cart while shopping which can help and allow her to walk further \par X-rays done of lumbar spine showed degenerative changes, Grade 1 anterolisthesis of L4 on L5

## 2019-02-27 NOTE — REVIEW OF SYSTEMS
[Fever] : no fever [Chills] : no chills [Chest Pain] : no chest pain [Shortness Of Breath] : no shortness of breath [Cough] : no cough [Back Pain] : back pain

## 2019-02-27 NOTE — ASSESSMENT
[FreeTextEntry1] : DM II:\par last HgA1C 6.8, controlled\par c/w Glimepiride\par c/w diet and exercise\par check blood work, reassess \par \par CKD:\par renal function has improved\par off of Metformin, Lisinopril \par monitor\par \par CAD:\par c/w current medications\par f/u with cardiology\par \par PVD:\par f/u with vascular surgery, cardiology\par \par Chronic low back pain with left sided sciatica:\par will check MRI of lumbosacral spine to assess for lumbar stenosis\par advised to go for physical therapy\par may see orthopedic spine surgery\par \par HTN:\par stable\par c/w Metoprolol\par \par Hyperlipidemia:\par c/w Atorvastatin\par c/w diet and exercise\par check blood work\par \par Gout:\par c/w Allopurinol\par check blood work\par \par Obesity:\par c/w diet and exercise. \par weight loss

## 2019-03-04 ENCOUNTER — TRANSCRIPTION ENCOUNTER (OUTPATIENT)
Age: 69
End: 2019-03-04

## 2019-03-04 ENCOUNTER — RX RENEWAL (OUTPATIENT)
Age: 69
End: 2019-03-04

## 2019-03-14 ENCOUNTER — FORM ENCOUNTER (OUTPATIENT)
Age: 69
End: 2019-03-14

## 2019-03-15 ENCOUNTER — APPOINTMENT (OUTPATIENT)
Dept: MAMMOGRAPHY | Facility: CLINIC | Age: 69
End: 2019-03-15
Payer: MEDICARE

## 2019-03-15 ENCOUNTER — APPOINTMENT (OUTPATIENT)
Dept: MRI IMAGING | Facility: CLINIC | Age: 69
End: 2019-03-15
Payer: MEDICARE

## 2019-03-15 ENCOUNTER — OUTPATIENT (OUTPATIENT)
Dept: OUTPATIENT SERVICES | Facility: HOSPITAL | Age: 69
LOS: 1 days | End: 2019-03-15
Payer: MEDICARE

## 2019-03-15 ENCOUNTER — APPOINTMENT (OUTPATIENT)
Dept: ULTRASOUND IMAGING | Facility: CLINIC | Age: 69
End: 2019-03-15
Payer: MEDICARE

## 2019-03-15 DIAGNOSIS — Z12.31 ENCOUNTER FOR SCREENING MAMMOGRAM FOR MALIGNANT NEOPLASM OF BREAST: ICD-10-CM

## 2019-03-15 DIAGNOSIS — E04.1 NONTOXIC SINGLE THYROID NODULE: ICD-10-CM

## 2019-03-15 PROCEDURE — 77067 SCR MAMMO BI INCL CAD: CPT | Mod: 26

## 2019-03-15 PROCEDURE — 77067 SCR MAMMO BI INCL CAD: CPT

## 2019-03-15 PROCEDURE — 72148 MRI LUMBAR SPINE W/O DYE: CPT

## 2019-03-15 PROCEDURE — 77063 BREAST TOMOSYNTHESIS BI: CPT | Mod: 26

## 2019-03-15 PROCEDURE — 76536 US EXAM OF HEAD AND NECK: CPT | Mod: 26

## 2019-03-15 PROCEDURE — 77063 BREAST TOMOSYNTHESIS BI: CPT

## 2019-03-15 PROCEDURE — 72148 MRI LUMBAR SPINE W/O DYE: CPT | Mod: 26

## 2019-03-15 PROCEDURE — 76536 US EXAM OF HEAD AND NECK: CPT

## 2019-04-03 ENCOUNTER — RX RENEWAL (OUTPATIENT)
Age: 69
End: 2019-04-03

## 2019-04-03 ENCOUNTER — TRANSCRIPTION ENCOUNTER (OUTPATIENT)
Age: 69
End: 2019-04-03

## 2019-04-06 ENCOUNTER — TRANSCRIPTION ENCOUNTER (OUTPATIENT)
Age: 69
End: 2019-04-06

## 2019-04-06 ENCOUNTER — RX RENEWAL (OUTPATIENT)
Age: 69
End: 2019-04-06

## 2019-04-06 RX ORDER — LANCETS 30 GAUGE
EACH MISCELLANEOUS
Qty: 1 | Refills: 2 | Status: ACTIVE | COMMUNITY
Start: 2018-11-07 | End: 1900-01-01

## 2019-04-08 ENCOUNTER — TRANSCRIPTION ENCOUNTER (OUTPATIENT)
Age: 69
End: 2019-04-08

## 2019-04-19 ENCOUNTER — APPOINTMENT (OUTPATIENT)
Dept: CARDIOLOGY | Facility: CLINIC | Age: 69
End: 2019-04-19
Payer: MEDICARE

## 2019-04-19 ENCOUNTER — NON-APPOINTMENT (OUTPATIENT)
Age: 69
End: 2019-04-19

## 2019-04-19 VITALS
OXYGEN SATURATION: 98 % | SYSTOLIC BLOOD PRESSURE: 160 MMHG | HEART RATE: 78 BPM | BODY MASS INDEX: 37.35 KG/M2 | DIASTOLIC BLOOD PRESSURE: 77 MMHG | WEIGHT: 238 LBS | HEIGHT: 67 IN

## 2019-04-19 DIAGNOSIS — R06.09 OTHER FORMS OF DYSPNEA: ICD-10-CM

## 2019-04-19 PROCEDURE — 93000 ELECTROCARDIOGRAM COMPLETE: CPT

## 2019-04-19 PROCEDURE — 99214 OFFICE O/P EST MOD 30 MIN: CPT | Mod: 25

## 2019-04-19 RX ORDER — NITROGLYCERIN 0.4 MG/1
0.4 TABLET SUBLINGUAL
Qty: 30 | Refills: 3 | Status: ACTIVE | COMMUNITY
Start: 2019-04-19 | End: 1900-01-01

## 2019-04-26 ENCOUNTER — APPOINTMENT (OUTPATIENT)
Dept: CARDIOLOGY | Facility: CLINIC | Age: 69
End: 2019-04-26
Payer: MEDICARE

## 2019-04-26 PROCEDURE — 93306 TTE W/DOPPLER COMPLETE: CPT

## 2019-04-26 RX ORDER — PERFLUTREN 6.52 MG/ML
6.52 INJECTION, SUSPENSION INTRAVENOUS
Qty: 1 | Refills: 0 | Status: COMPLETED | OUTPATIENT
Start: 2019-04-26

## 2019-04-26 RX ADMIN — PERFLUTREN MG/ML: 6.52 INJECTION, SUSPENSION INTRAVENOUS at 00:00

## 2019-05-05 ENCOUNTER — RX RENEWAL (OUTPATIENT)
Age: 69
End: 2019-05-05

## 2019-05-06 ENCOUNTER — RX RENEWAL (OUTPATIENT)
Age: 69
End: 2019-05-06

## 2019-05-07 PROBLEM — R06.09 DOE (DYSPNEA ON EXERTION): Status: ACTIVE | Noted: 2019-05-07

## 2019-05-07 NOTE — DISCUSSION/SUMMARY
[FreeTextEntry1] : Pt is a 69 y/o F with PMH CAD s/p 3V CABG 6/2016 normal LV function, PVD, s/p stent, ex-smoker, HTN, HLD, DM who presents today for f/u - c/o chest discomfort and dyspnea\par \par CAD: Given recent symptoms would like to check cardiac cath\par Repeat TTE\par c/w ASA, statin, metoprolol\par \par DM: she is compliant with all meds and follows with her PCP\par HTN: BP well controlled.  Advised low salt diet, regular exercise, weight loss\par \par HLD: last LDL = 76 basically at goal, TG elevated. c/w statin, advised low chol diet, regular exercise, weight loss\par \par The described plan was discussed with the pt.  All questions and concerns were addressed to the best of my knowledge.\par \par

## 2019-05-07 NOTE — REASON FOR VISIT
[Follow-Up - Clinic] : a clinic follow-up of [Coronary Artery Disease] : coronary artery disease [Hypertension] : hypertension [Chest Pain] : chest pain [Dyspnea] : dyspnea

## 2019-05-07 NOTE — PHYSICAL EXAM
[General Appearance - Well Developed] : well developed [Normal Appearance] : normal appearance [Well Groomed] : well groomed [General Appearance - Well Nourished] : well nourished [No Deformities] : no deformities [General Appearance - In No Acute Distress] : no acute distress [Normal Conjunctiva] : the conjunctiva exhibited no abnormalities [Eyelids - No Xanthelasma] : the eyelids demonstrated no xanthelasmas [Normal Oral Mucosa] : normal oral mucosa [No Oral Pallor] : no oral pallor [No Oral Cyanosis] : no oral cyanosis [Respiration, Rhythm And Depth] : normal respiratory rhythm and effort [Heart Rate And Rhythm] : heart rate and rhythm were normal [Auscultation Breath Sounds / Voice Sounds] : lungs were clear to auscultation bilaterally [Exaggerated Use Of Accessory Muscles For Inspiration] : no accessory muscle use [Murmurs] : no murmurs present [Heart Sounds] : normal S1 and S2 [Edema] : no peripheral edema present [Arterial Pulses Normal] : the arterial pulses were normal [Abdomen Tenderness] : non-tender [Abdomen Soft] : soft [Abdomen Mass (___ Cm)] : no abdominal mass palpated [Abnormal Walk] : normal gait [Gait - Sufficient For Exercise Testing] : the gait was sufficient for exercise testing [Nail Clubbing] : no clubbing of the fingernails [Cyanosis, Localized] : no localized cyanosis [Petechial Hemorrhages (___cm)] : no petechial hemorrhages [Skin Color & Pigmentation] : normal skin color and pigmentation [No Venous Stasis] : no venous stasis [] : no rash [No Skin Ulcers] : no skin ulcer [Skin Lesions] : no skin lesions [Oriented To Time, Place, And Person] : oriented to person, place, and time [No Xanthoma] : no  xanthoma was observed [Mood] : the mood was normal [Affect] : the affect was normal [Impaired Insight] : insight and judgment were intact [No Anxiety] : not feeling anxious [FreeTextEntry1] : no JVD or bruits

## 2019-05-07 NOTE — REVIEW OF SYSTEMS
[Negative] : Heme/Lymph [see HPI] : see HPI [Shortness Of Breath] : shortness of breath [Chest Pain] : chest pain

## 2019-05-07 NOTE — HISTORY OF PRESENT ILLNESS
[FreeTextEntry1] : Pt is a 67 y/o F who presents today for f/u.  She has PMH CAD s/p 3V CABG 07/2016 Dr Cullen at [facility] --> LAD, SVG --> RCA, SVG --> OM (symptoms were LUE tingling), normal LV function, ex-smoker, HTN, HLD, DM, PVD s/p LLE stent.  \par Last week she notes that when walking to her car after work she started to get cold feeling in upper part of chest radiating up to neck with choking sensation, dyspnea and she had to stop and rest.  She almost feels as if she had just been exercising at the gym.  \par \par CUS 2016 showed mild plaque\par

## 2019-05-16 ENCOUNTER — TRANSCRIPTION ENCOUNTER (OUTPATIENT)
Age: 69
End: 2019-05-16

## 2019-05-16 ENCOUNTER — OUTPATIENT (OUTPATIENT)
Dept: OUTPATIENT SERVICES | Facility: HOSPITAL | Age: 69
LOS: 1 days | End: 2019-05-16
Payer: MEDICARE

## 2019-05-16 VITALS
RESPIRATION RATE: 20 BRPM | SYSTOLIC BLOOD PRESSURE: 146 MMHG | HEART RATE: 68 BPM | OXYGEN SATURATION: 96 % | TEMPERATURE: 98 F | DIASTOLIC BLOOD PRESSURE: 66 MMHG

## 2019-05-16 VITALS — RESPIRATION RATE: 18 BRPM | DIASTOLIC BLOOD PRESSURE: 74 MMHG | SYSTOLIC BLOOD PRESSURE: 174 MMHG | HEART RATE: 65 BPM

## 2019-05-16 DIAGNOSIS — R07.9 CHEST PAIN, UNSPECIFIED: ICD-10-CM

## 2019-05-16 DIAGNOSIS — Z95.1 PRESENCE OF AORTOCORONARY BYPASS GRAFT: Chronic | ICD-10-CM

## 2019-05-16 DIAGNOSIS — I25.10 ATHEROSCLEROTIC HEART DISEASE OF NATIVE CORONARY ARTERY WITHOUT ANGINA PECTORIS: ICD-10-CM

## 2019-05-16 LAB
ANION GAP SERPL CALC-SCNC: 15 MMOL/L — SIGNIFICANT CHANGE UP (ref 5–17)
APTT BLD: 31.8 SEC — SIGNIFICANT CHANGE UP (ref 27.5–36.3)
BUN SERPL-MCNC: 45 MG/DL — HIGH (ref 8–20)
CALCIUM SERPL-MCNC: 9.5 MG/DL — SIGNIFICANT CHANGE UP (ref 8.6–10.2)
CHLORIDE SERPL-SCNC: 104 MMOL/L — SIGNIFICANT CHANGE UP (ref 98–107)
CO2 SERPL-SCNC: 21 MMOL/L — LOW (ref 22–29)
CREAT SERPL-MCNC: 1.2 MG/DL — SIGNIFICANT CHANGE UP (ref 0.5–1.3)
GLUCOSE SERPL-MCNC: 139 MG/DL — HIGH (ref 70–115)
HCT VFR BLD CALC: 37.2 % — SIGNIFICANT CHANGE UP (ref 37–47)
HGB BLD-MCNC: 12.1 G/DL — SIGNIFICANT CHANGE UP (ref 12–16)
INR BLD: 1.07 RATIO — SIGNIFICANT CHANGE UP (ref 0.88–1.16)
MCHC RBC-ENTMCNC: 30.4 PG — SIGNIFICANT CHANGE UP (ref 27–31)
MCHC RBC-ENTMCNC: 32.5 G/DL — SIGNIFICANT CHANGE UP (ref 32–36)
MCV RBC AUTO: 93.5 FL — SIGNIFICANT CHANGE UP (ref 81–99)
PLATELET # BLD AUTO: 218 K/UL — SIGNIFICANT CHANGE UP (ref 150–400)
POTASSIUM SERPL-MCNC: 4.7 MMOL/L — SIGNIFICANT CHANGE UP (ref 3.5–5.3)
POTASSIUM SERPL-SCNC: 4.7 MMOL/L — SIGNIFICANT CHANGE UP (ref 3.5–5.3)
PROTHROM AB SERPL-ACNC: 12.3 SEC — SIGNIFICANT CHANGE UP (ref 10–12.9)
RBC # BLD: 3.98 M/UL — LOW (ref 4.4–5.2)
RBC # FLD: 13.3 % — SIGNIFICANT CHANGE UP (ref 11–15.6)
SODIUM SERPL-SCNC: 140 MMOL/L — SIGNIFICANT CHANGE UP (ref 135–145)
WBC # BLD: 5.8 K/UL — SIGNIFICANT CHANGE UP (ref 4.8–10.8)
WBC # FLD AUTO: 5.8 K/UL — SIGNIFICANT CHANGE UP (ref 4.8–10.8)

## 2019-05-16 PROCEDURE — 93005 ELECTROCARDIOGRAM TRACING: CPT

## 2019-05-16 PROCEDURE — 92928 PRQ TCAT PLMT NTRAC ST 1 LES: CPT | Mod: RC

## 2019-05-16 PROCEDURE — 36415 COLL VENOUS BLD VENIPUNCTURE: CPT

## 2019-05-16 PROCEDURE — C1887: CPT

## 2019-05-16 PROCEDURE — 93459 L HRT ART/GRFT ANGIO: CPT | Mod: 26,XU

## 2019-05-16 PROCEDURE — 99152 MOD SED SAME PHYS/QHP 5/>YRS: CPT

## 2019-05-16 PROCEDURE — 80048 BASIC METABOLIC PNL TOTAL CA: CPT

## 2019-05-16 PROCEDURE — C1874: CPT

## 2019-05-16 PROCEDURE — C9600: CPT | Mod: RC

## 2019-05-16 PROCEDURE — 85610 PROTHROMBIN TIME: CPT

## 2019-05-16 PROCEDURE — 99153 MOD SED SAME PHYS/QHP EA: CPT

## 2019-05-16 PROCEDURE — 85730 THROMBOPLASTIN TIME PARTIAL: CPT

## 2019-05-16 PROCEDURE — 85027 COMPLETE CBC AUTOMATED: CPT

## 2019-05-16 PROCEDURE — 93459 L HRT ART/GRFT ANGIO: CPT | Mod: XU

## 2019-05-16 PROCEDURE — C1769: CPT

## 2019-05-16 PROCEDURE — C1894: CPT

## 2019-05-16 PROCEDURE — C1725: CPT

## 2019-05-16 PROCEDURE — 93010 ELECTROCARDIOGRAM REPORT: CPT

## 2019-05-16 RX ORDER — ATORVASTATIN CALCIUM 80 MG/1
1 TABLET, FILM COATED ORAL
Qty: 0 | Refills: 0 | DISCHARGE

## 2019-05-16 RX ORDER — CLOPIDOGREL BISULFATE 75 MG/1
1 TABLET, FILM COATED ORAL
Qty: 14 | Refills: 0
Start: 2019-05-16

## 2019-05-16 RX ORDER — GLIMEPIRIDE 1 MG
1 TABLET ORAL
Qty: 0 | Refills: 0 | DISCHARGE

## 2019-05-16 RX ORDER — CLOPIDOGREL BISULFATE 75 MG/1
1 TABLET, FILM COATED ORAL
Qty: 90 | Refills: 5
Start: 2019-05-16

## 2019-05-16 RX ORDER — ASPIRIN/CALCIUM CARB/MAGNESIUM 324 MG
81 TABLET ORAL ONCE
Refills: 0 | Status: DISCONTINUED | OUTPATIENT
Start: 2019-05-16 | End: 2019-05-31

## 2019-05-16 RX ORDER — ASPIRIN/CALCIUM CARB/MAGNESIUM 324 MG
81 TABLET ORAL DAILY
Refills: 0 | Status: DISCONTINUED | OUTPATIENT
Start: 2019-05-17 | End: 2019-05-31

## 2019-05-16 RX ORDER — ALLOPURINOL 300 MG
0 TABLET ORAL
Qty: 0 | Refills: 0 | DISCHARGE

## 2019-05-16 RX ORDER — SODIUM CHLORIDE 9 MG/ML
1000 INJECTION INTRAMUSCULAR; INTRAVENOUS; SUBCUTANEOUS
Refills: 0 | Status: DISCONTINUED | OUTPATIENT
Start: 2019-05-16 | End: 2019-05-31

## 2019-05-16 RX ORDER — CLOPIDOGREL BISULFATE 75 MG/1
1 TABLET, FILM COATED ORAL
Qty: 14 | Refills: 0
Start: 2019-05-16 | End: 2019-06-14

## 2019-05-16 RX ORDER — METOPROLOL TARTRATE 50 MG
1 TABLET ORAL
Qty: 0 | Refills: 0 | DISCHARGE

## 2019-05-16 RX ORDER — ASPIRIN/CALCIUM CARB/MAGNESIUM 324 MG
1 TABLET ORAL
Qty: 0 | Refills: 0 | DISCHARGE

## 2019-05-16 RX ORDER — CLOPIDOGREL BISULFATE 75 MG/1
75 TABLET, FILM COATED ORAL DAILY
Refills: 0 | Status: DISCONTINUED | OUTPATIENT
Start: 2019-05-17 | End: 2019-05-31

## 2019-05-16 NOTE — DISCHARGE NOTE NURSING/CASE MANAGEMENT/SOCIAL WORK - NSDCDPATPORTLINK_GEN_ALL_CORE
You can access the [facility] Patient Portal, offered by [facility], by registering with the following website: [email or web address]

## 2019-05-16 NOTE — DISCHARGE NOTE PROVIDER - CARE PROVIDER_API CALL
Yue June (DO)  Internal Medicine  [address], [address]  [place] [address]  Phone: [phone]  Fax: [phone]  Follow Up Time:

## 2019-05-16 NOTE — DISCHARGE NOTE PROVIDER - NSDCACTIVITY_GEN_ALL_CORE
Sex allowed/Do not drive or operate machinery/Walking - Outdoors allowed/Showering allowed/Return to Work/School allowed/No heavy lifting/straining/Do not make important decisions/Stairs allowed/Walking - Indoors allowed

## 2019-05-16 NOTE — H&P PST ADULT - NSICDXPASTMEDICALHX_GEN_ALL_CORE_FT
PAST MEDICAL HISTORY:  Acute left-sided low back pain, with sciatica presence unspecified     Anemia     Anemia due to chronic renal failure treated with erythropoietin, unspecified CKD stage     CAD (coronary artery disease), native coronary artery     Chronic back pain     DM type 2, not at goal     Gout     H/O fatigue     History of lymphadenopathy     Hyperlipidemia     Hypertension     Obesity     Pain of facet joint     PVD (peripheral vascular disease)     Thyroid nodule

## 2019-05-16 NOTE — H&P PST ADULT - HISTORY OF PRESENT ILLNESS
Pt is a 69 y/o F who presents today for f/u. She has PMH CAD s/p 3V CABG 07/2016 Dr Cullen at [facility] --> LAD, SVG --> RCA, SVG --> OM (symptoms were LUE tingling), normal LV function, ex-smoker, HTN, HLD, DM, PVD s/p LLE stent.   Last month she notes that when walking to her car after work she started to get cold feeling in upper part of chest radiating up to neck with choking sensation, dyspnea and she had to stop and rest. She almost feels as if she had just been exercising at the gym.     CUS 2016 showed mild plaque    Patine had echo 4/26/19 showed mild MR, mild aortic regurgitation, mild TR, mild pulmonic regurgitation and moderate diastolic dysfunction with EF 55% and inferior wall appears to be hypokinetic.  Patient here for PST/LHC to r/o further CAD.

## 2019-05-16 NOTE — ASU PATIENT PROFILE, ADULT - PMH
Acute left-sided low back pain, with sciatica presence unspecified    Anemia    Anemia due to chronic renal failure treated with erythropoietin, unspecified CKD stage    CAD (coronary artery disease), native coronary artery    Chronic back pain    DM type 2, not at goal    Gout    H/O fatigue    History of lymphadenopathy    Hyperlipidemia    Hypertension    Obesity    Pain of facet joint    PVD (peripheral vascular disease)    Thyroid nodule

## 2019-05-16 NOTE — DISCHARGE NOTE PROVIDER - HOSPITAL COURSE
Pt is a 69 y/o F who presents today for f/u. She has PMH CAD s/p 3V CABG 07/2016 Dr Cullen at [facility] --> LAD, SVG --> RCA, SVG --> OM (symptoms were LUE tingling), normal LV function, ex-smoker, HTN, HLD, DM, PVD s/p LLE stent.     Last month she notes that when walking to her car after work she started to get cold feeling in upper part of chest radiating up to neck with choking sensation, dyspnea and she had to stop and rest. She almost feels as if she had just been exercising at the gym.         CUS 2016 showed mild plaque        Patine had echo 4/26/19 showed mild MR, mild aortic regurgitation, mild TR, mild pulmonic regurgitation and moderate diastolic dysfunction with EF 55% and inferior wall appears to be hypokinetic.    Patient here for PST/LHC to r/o further CAD.    S/P cath    RCA disease    RUFUS X 3 to native RCA    Aspirin and plavix protocol emphasized    Post procedure teaching done with patient    Patient verbalizes understanding    12 lead EKG SR no acute changes noted    Denies post procedure pain    Ambulating well    Remove band at 1330    OK to D/C after 1630 as per Dr Gerardo    Follow up with Dr June as outpatient in 1 week    Left radial site benign.    Will monitor for change

## 2019-05-22 ENCOUNTER — APPOINTMENT (OUTPATIENT)
Dept: CARDIOLOGY | Facility: CLINIC | Age: 69
End: 2019-05-22
Payer: MEDICARE

## 2019-05-22 ENCOUNTER — NON-APPOINTMENT (OUTPATIENT)
Age: 69
End: 2019-05-22

## 2019-05-22 VITALS
HEIGHT: 67 IN | WEIGHT: 238 LBS | BODY MASS INDEX: 37.35 KG/M2 | DIASTOLIC BLOOD PRESSURE: 73 MMHG | OXYGEN SATURATION: 96 % | HEART RATE: 73 BPM | SYSTOLIC BLOOD PRESSURE: 146 MMHG

## 2019-05-22 DIAGNOSIS — Z95.1 PRESENCE OF AORTOCORONARY BYPASS GRAFT: ICD-10-CM

## 2019-05-22 PROBLEM — Z87.898 PERSONAL HISTORY OF OTHER SPECIFIED CONDITIONS: Chronic | Status: ACTIVE | Noted: 2019-05-16

## 2019-05-22 PROBLEM — I10 ESSENTIAL (PRIMARY) HYPERTENSION: Chronic | Status: ACTIVE | Noted: 2019-05-16

## 2019-05-22 PROBLEM — E11.9 TYPE 2 DIABETES MELLITUS WITHOUT COMPLICATIONS: Chronic | Status: ACTIVE | Noted: 2019-05-16

## 2019-05-22 PROBLEM — M54.9 DORSALGIA, UNSPECIFIED: Chronic | Status: ACTIVE | Noted: 2019-05-16

## 2019-05-22 PROBLEM — E66.9 OBESITY, UNSPECIFIED: Chronic | Status: ACTIVE | Noted: 2019-05-16

## 2019-05-22 PROBLEM — E04.1 NONTOXIC SINGLE THYROID NODULE: Chronic | Status: ACTIVE | Noted: 2019-05-16

## 2019-05-22 PROBLEM — M10.9 GOUT, UNSPECIFIED: Chronic | Status: ACTIVE | Noted: 2019-05-16

## 2019-05-22 PROBLEM — D64.9 ANEMIA, UNSPECIFIED: Chronic | Status: ACTIVE | Noted: 2019-05-16

## 2019-05-22 PROBLEM — E78.5 HYPERLIPIDEMIA, UNSPECIFIED: Chronic | Status: ACTIVE | Noted: 2019-05-16

## 2019-05-22 PROBLEM — M54.5 LOW BACK PAIN: Chronic | Status: ACTIVE | Noted: 2019-05-16

## 2019-05-22 PROBLEM — I25.10 ATHEROSCLEROTIC HEART DISEASE OF NATIVE CORONARY ARTERY WITHOUT ANGINA PECTORIS: Chronic | Status: ACTIVE | Noted: 2019-05-16

## 2019-05-22 PROBLEM — I73.9 PERIPHERAL VASCULAR DISEASE, UNSPECIFIED: Chronic | Status: ACTIVE | Noted: 2019-05-16

## 2019-05-22 PROBLEM — N18.9 CHRONIC KIDNEY DISEASE, UNSPECIFIED: Chronic | Status: ACTIVE | Noted: 2019-05-16

## 2019-05-22 PROCEDURE — 99214 OFFICE O/P EST MOD 30 MIN: CPT | Mod: 25

## 2019-05-22 PROCEDURE — 93000 ELECTROCARDIOGRAM COMPLETE: CPT

## 2019-05-22 RX ORDER — CLOPIDOGREL BISULFATE 75 MG/1
75 TABLET, FILM COATED ORAL
Refills: 0 | Status: ACTIVE | COMMUNITY

## 2019-05-22 NOTE — HISTORY OF PRESENT ILLNESS
[FreeTextEntry1] : Pt is a 67 y/o F who presents today for f/u.  She has PMH CAD s/p 3V CABG 07/2016 Dr Cullen at [facility] --> LAD, SVG --> RCA, SVG --> OM (symptoms were LUE tingling), normal LV function, ex-smoker, HTN, HLD, DM, PVD s/p LLE stent.  \par She had cardiac cath 05/16/2019 [place] with Dr Gerardo which showed occluded SVG --> RCA now s/p DESx3 to prx and mid RCA.  L radial access. No complications.  She states that she is feeling well and has no complaints.  She denies CP, SOB, diaphoresis, palpitations, dizziness, syncope, LE edema, PND. \par \par CUS 2016 showed mild plaque\par

## 2019-05-22 NOTE — DISCUSSION/SUMMARY
[FreeTextEntry1] : Pt is a 69 y/o F with PMH CAD s/p 3V CABG 6/2016 normal LV function, PVD, s/p stent, ex-smoker, HTN, HLD, DM who presents today for f/u cardiac cath\par She had cardiac cath 05/16/2019 [place] with Dr Gerardo which showed occluded SVG --> RCA now s/p DESx3 to prx and mid RCA.  No complications.  She states that she is feeling well and has no complaints.  She denies CP, SOB, diaphoresis, palpitations, dizziness, syncope, LE edema, PND. \par \par CAD: plavix added\par c/w ASA, metoprolol\par will increase lipitor to 40mg qd - goal LDL 70\par \par DM: she is compliant with all meds and follows with her PCP\par \par HTN: BP usually well controlled.  Advised low salt diet, regular exercise, weight loss\par \par HLD: increase statin, advised low chol diet, regular exercise, weight loss\par \par f/u 3 mnths\par The described plan was discussed with the pt.  All questions and concerns were addressed to the best of my knowledge.\par \par

## 2019-05-22 NOTE — REASON FOR VISIT
[Follow-Up - Clinic] : a clinic follow-up of [Chest Pain] : chest pain [Coronary Artery Disease] : coronary artery disease [Dyspnea] : dyspnea [Hypertension] : hypertension

## 2019-05-22 NOTE — PHYSICAL EXAM
[General Appearance - Well Developed] : well developed [Normal Appearance] : normal appearance [Well Groomed] : well groomed [General Appearance - Well Nourished] : well nourished [No Deformities] : no deformities [General Appearance - In No Acute Distress] : no acute distress [Normal Conjunctiva] : the conjunctiva exhibited no abnormalities [Eyelids - No Xanthelasma] : the eyelids demonstrated no xanthelasmas [Normal Oral Mucosa] : normal oral mucosa [No Oral Pallor] : no oral pallor [No Oral Cyanosis] : no oral cyanosis [Respiration, Rhythm And Depth] : normal respiratory rhythm and effort [Exaggerated Use Of Accessory Muscles For Inspiration] : no accessory muscle use [Auscultation Breath Sounds / Voice Sounds] : lungs were clear to auscultation bilaterally [Heart Rate And Rhythm] : heart rate and rhythm were normal [Heart Sounds] : normal S1 and S2 [Murmurs] : no murmurs present [Arterial Pulses Normal] : the arterial pulses were normal [Edema] : no peripheral edema present [Abdomen Soft] : soft [Abdomen Tenderness] : non-tender [Abdomen Mass (___ Cm)] : no abdominal mass palpated [Abnormal Walk] : normal gait [Gait - Sufficient For Exercise Testing] : the gait was sufficient for exercise testing [Nail Clubbing] : no clubbing of the fingernails [Cyanosis, Localized] : no localized cyanosis [Petechial Hemorrhages (___cm)] : no petechial hemorrhages [Skin Color & Pigmentation] : normal skin color and pigmentation [] : no rash [No Venous Stasis] : no venous stasis [Skin Lesions] : no skin lesions [No Skin Ulcers] : no skin ulcer [No Xanthoma] : no  xanthoma was observed [Oriented To Time, Place, And Person] : oriented to person, place, and time [Impaired Insight] : insight and judgment were intact [Affect] : the affect was normal [Mood] : the mood was normal [No Anxiety] : not feeling anxious [FreeTextEntry1] : L radial with healing ecchymosis, no hematoma, good prox and distal pulses

## 2019-05-26 ENCOUNTER — RX RENEWAL (OUTPATIENT)
Age: 69
End: 2019-05-26

## 2019-05-28 ENCOUNTER — RX RENEWAL (OUTPATIENT)
Age: 69
End: 2019-05-28

## 2019-06-05 ENCOUNTER — APPOINTMENT (OUTPATIENT)
Dept: FAMILY MEDICINE | Facility: CLINIC | Age: 69
End: 2019-06-05
Payer: MEDICARE

## 2019-06-05 VITALS
OXYGEN SATURATION: 98 % | SYSTOLIC BLOOD PRESSURE: 162 MMHG | HEART RATE: 76 BPM | DIASTOLIC BLOOD PRESSURE: 70 MMHG | WEIGHT: 238 LBS | BODY MASS INDEX: 37.35 KG/M2 | HEIGHT: 67 IN

## 2019-06-05 VITALS — DIASTOLIC BLOOD PRESSURE: 68 MMHG | RESPIRATION RATE: 16 BRPM | SYSTOLIC BLOOD PRESSURE: 120 MMHG

## 2019-06-05 DIAGNOSIS — Z87.898 PERSONAL HISTORY OF OTHER SPECIFIED CONDITIONS: ICD-10-CM

## 2019-06-05 PROCEDURE — 99214 OFFICE O/P EST MOD 30 MIN: CPT

## 2019-06-26 ENCOUNTER — OTHER (OUTPATIENT)
Age: 69
End: 2019-06-26

## 2019-06-26 ENCOUNTER — TRANSCRIPTION ENCOUNTER (OUTPATIENT)
Age: 69
End: 2019-06-26

## 2019-06-27 ENCOUNTER — TRANSCRIPTION ENCOUNTER (OUTPATIENT)
Age: 69
End: 2019-06-27

## 2019-06-27 NOTE — HISTORY OF PRESENT ILLNESS
[FreeTextEntry1] : Follow up [de-identified] : \par 69 year old female presents for follow up\par \par Has CAD s/p CABG (3 vessel) in 2016, recently had cath angiogram with 3 stents placed\par following with cardiology\par \par PVD s/p LLE stent\par \par Has DM II, last HgA1C 5.9 from March 2019\par Off of Metformin and Lisinopril as renal function worsened on medications \par On Glimepiride, doing well on medication\par \par HTN, on Metoprolol\par \par Hyperlipidemia, on Atorvastatin \par \par H/o Gout, on Allopurinol \par \par Remains with chronic low back pain\par Had MRI of lumbosacral spine- showed osteoarthritis , stenosis\par looking to see a specialist \par  [EyeExamDate] : 06/19 [No Retinopathy] : No retinopathy

## 2019-06-27 NOTE — PHYSICAL EXAM
[No Acute Distress] : no acute distress [Well Nourished] : well nourished [Well Developed] : well developed [Normal Appearance] : was normal in appearance [Normal Oropharynx] : the oropharynx was normal [Neck Supple] : was supple [No Respiratory Distress] : no respiratory distress  [Clear to Auscultation] : lungs were clear to auscultation bilaterally [Normal Rate] : normal rate  [Regular Rhythm] : with a regular rhythm [Normal S1, S2] : normal S1 and S2 [No Murmur] : no murmur heard [No Edema] : there was no peripheral edema [Soft] : abdomen soft [Non Tender] : non-tender [Normal Anterior Cervical Nodes] : no anterior cervical lymphadenopathy [Normal Posterior Cervical Nodes] : no posterior cervical lymphadenopathy [Alert and Oriented x3] : oriented to person, place, and time [Right Foot Was Examined] : Right foot ~C was examined [Left Foot Was Examined] : left foot ~C was examined [None] : no ulcers in either foot were found [Normal Affect] : the affect was normal [Normal Mood] : the mood was normal

## 2019-06-27 NOTE — ASSESSMENT
[FreeTextEntry1] : DM II- stable, c/w Glimepiride, c/w diet and exercise as tolerated, check blood work\par HTN- BP improved upon repeat check, c/w Metoprolol\par Hyperlipidemia- c/w Atorvastatin, c/w diet and exercise as tolerated, check blood work\par CAD/PVD- c/w current medications, following with cardiology \par CKD- check blood work\par \par return as needed and follow up in 3 months

## 2019-06-27 NOTE — REVIEW OF SYSTEMS
[Fever] : no fever [Chills] : no chills [Shortness Of Breath] : no shortness of breath [Chest Pain] : no chest pain [Cough] : no cough [Abdominal Pain] : no abdominal pain

## 2019-07-02 ENCOUNTER — TRANSCRIPTION ENCOUNTER (OUTPATIENT)
Age: 69
End: 2019-07-02

## 2019-07-07 ENCOUNTER — RX RENEWAL (OUTPATIENT)
Age: 69
End: 2019-07-07

## 2019-07-07 RX ORDER — LANCETS 33 GAUGE
EACH MISCELLANEOUS
Qty: 100 | Refills: 0 | Status: ACTIVE | COMMUNITY
Start: 2019-05-26 | End: 1900-01-01

## 2019-07-08 ENCOUNTER — TRANSCRIPTION ENCOUNTER (OUTPATIENT)
Age: 69
End: 2019-07-08

## 2019-07-09 ENCOUNTER — TRANSCRIPTION ENCOUNTER (OUTPATIENT)
Age: 69
End: 2019-07-09

## 2019-07-10 ENCOUNTER — APPOINTMENT (OUTPATIENT)
Dept: CARDIOLOGY | Facility: CLINIC | Age: 69
End: 2019-07-10
Payer: MEDICARE

## 2019-07-10 PROCEDURE — 93880 EXTRACRANIAL BILAT STUDY: CPT | Mod: 26

## 2019-07-17 ENCOUNTER — NON-APPOINTMENT (OUTPATIENT)
Age: 69
End: 2019-07-17

## 2019-07-17 ENCOUNTER — APPOINTMENT (OUTPATIENT)
Dept: CARDIOLOGY | Facility: CLINIC | Age: 69
End: 2019-07-17
Payer: MEDICARE

## 2019-07-17 VITALS
HEART RATE: 72 BPM | WEIGHT: 238 LBS | OXYGEN SATURATION: 97 % | SYSTOLIC BLOOD PRESSURE: 134 MMHG | HEIGHT: 67 IN | DIASTOLIC BLOOD PRESSURE: 74 MMHG | BODY MASS INDEX: 37.35 KG/M2

## 2019-07-17 PROCEDURE — 99214 OFFICE O/P EST MOD 30 MIN: CPT | Mod: 25

## 2019-07-17 PROCEDURE — 93000 ELECTROCARDIOGRAM COMPLETE: CPT

## 2019-07-25 ENCOUNTER — TRANSCRIPTION ENCOUNTER (OUTPATIENT)
Age: 69
End: 2019-07-25

## 2019-08-04 ENCOUNTER — RX RENEWAL (OUTPATIENT)
Age: 69
End: 2019-08-04

## 2019-08-05 ENCOUNTER — RX RENEWAL (OUTPATIENT)
Age: 69
End: 2019-08-05

## 2019-08-05 RX ORDER — BLOOD SUGAR DIAGNOSTIC
STRIP MISCELLANEOUS
Qty: 200 | Refills: 3 | Status: ACTIVE | COMMUNITY
Start: 2018-11-07 | End: 1900-01-01

## 2019-08-08 ENCOUNTER — NON-APPOINTMENT (OUTPATIENT)
Age: 69
End: 2019-08-08

## 2019-08-08 NOTE — REVIEW OF SYSTEMS
[see HPI] : see HPI [Negative] : Heme/Lymph [Shortness Of Breath] : no shortness of breath [Dyspnea on exertion] : not dyspnea during exertion [Chest  Pressure] : no chest pressure [Lower Ext Edema] : no extremity edema [Chest Pain] : chest pain [Palpitations] : no palpitations [Leg Claudication] : no intermittent leg claudication

## 2019-08-08 NOTE — HISTORY OF PRESENT ILLNESS
[FreeTextEntry1] : Pt is a 68 y/o F who presents today for f/u.  She has PMH CAD s/p 3V CABG 07/2016 Dr Cullen at [facility] --> LAD, SVG --> RCA, SVG --> OM (symptoms were LUE tingling), normal LV function, ex-smoker, HTN, HLD, DM, PVD s/p LLE stent.  \par She had cardiac cath 05/16/2019 [place] with Dr Gerardo which showed occluded SVG --> RCA now s/p DESx3 to prx and mid RCA.  L radial access. No complications.  \par Today she tells me that she has started to have similar chest discomfort symptoms with exertion but not as bad.  No associated symptoms.  She denies SOB, diaphoresis, palpitations, dizziness, syncope, LE edema, PND. \par \par CUS 2016 showed mild plaque\par CUS 07/2019 CUS mild plaque b/l\par

## 2019-08-08 NOTE — DISCUSSION/SUMMARY
[FreeTextEntry1] : Pt is a 70 y/o F with PMH CAD s/p 3V CABG 6/2016, cardiac cath 05/16/2019 [place] with Dr Gerardo which showed occluded SVG --> RCA now s/p DESx3 to prx and mid RCA, normal LV function, PVD, s/p stent, ex-smoker, HTN, HLD, DM who presents today with chest discomfort\par \par CAD: \par I will add imdur to hopefully help with symptoms\par c/w ASA and plavix, metoprolol\par c/w lipitor 40mg qd - goal LDL 70\par \par DM: she is compliant with all meds and follows with her PCP\par \par HTN: BP usually well controlled.  Advised low salt diet, regular exercise, weight loss\par \par HLD: c/w statin, advised low chol diet, regular exercise, weight loss\par \par f/u 3 mnths\par \par The described plan was discussed with the pt.  All questions and concerns were addressed to the best of my knowledge.\par \par

## 2019-08-28 ENCOUNTER — TRANSCRIPTION ENCOUNTER (OUTPATIENT)
Age: 69
End: 2019-08-28

## 2019-09-06 ENCOUNTER — APPOINTMENT (OUTPATIENT)
Dept: FAMILY MEDICINE | Facility: CLINIC | Age: 69
End: 2019-09-06
Payer: MEDICARE

## 2019-09-06 VITALS
DIASTOLIC BLOOD PRESSURE: 78 MMHG | SYSTOLIC BLOOD PRESSURE: 130 MMHG | HEART RATE: 82 BPM | OXYGEN SATURATION: 98 % | RESPIRATION RATE: 16 BRPM | BODY MASS INDEX: 37.2 KG/M2 | HEIGHT: 67 IN | WEIGHT: 237 LBS

## 2019-09-06 VITALS — SYSTOLIC BLOOD PRESSURE: 112 MMHG | DIASTOLIC BLOOD PRESSURE: 60 MMHG

## 2019-09-06 PROCEDURE — 99214 OFFICE O/P EST MOD 30 MIN: CPT

## 2019-09-06 NOTE — HISTORY OF PRESENT ILLNESS
[FreeTextEntry1] : Follow up [de-identified] : \par 69 year old female presents for follow up\par \par Has CAD s/p CABG (3 vessel) in 2016,  had cath angiogram with 3 stents placed in May 2019\par following with cardiology\par recently c/o chest pains to cardiology- was started on Isosorbide Mononitrate\par has recently been feeling better- no longer with chest pains\par \par PVD s/p LLE stent\par \par Has DM II, most recent HgA1C 6.1 \par Off of Metformin and Lisinopril as renal function worsened on medications \par On Glimepiride, doing well on medication\par not able to exercise regularly due to chronic back pain- trying to move and walk as best she can \par \par HTN, on Metoprolol\par \par Hyperlipidemia, on Atorvastatin \par will look to start Omega xl to improve triglycerides\par \par H/o Gout, on Allopurinol\par \par up to date with mammogram\par \par declines colonoscopy- up to date with FIT testing, negative from April 2019

## 2019-09-06 NOTE — PHYSICAL EXAM
[No Acute Distress] : no acute distress [Well Nourished] : well nourished [Well Developed] : well developed [Normal Appearance] : was normal in appearance [Neck Supple] : was supple [No Respiratory Distress] : no respiratory distress  [Clear to Auscultation] : lungs were clear to auscultation bilaterally [Normal Rate] : normal rate  [Regular Rhythm] : with a regular rhythm [Normal S1, S2] : normal S1 and S2 [No Murmur] : no murmur heard [No Carotid Bruits] : no carotid bruits [No Edema] : there was no peripheral edema [Soft] : abdomen soft [Non Tender] : non-tender [Normal Posterior Cervical Nodes] : no posterior cervical lymphadenopathy [Normal Anterior Cervical Nodes] : no anterior cervical lymphadenopathy [Alert and Oriented x3] : oriented to person, place, and time [Normal Affect] : the affect was normal [Normal Mood] : the mood was normal

## 2019-09-06 NOTE — PLAN
[FreeTextEntry1] : DM II- controlled, on Glimepiride, c/w diet and exercise, up to date with ophthalmology \par HTN- stable, c/w Metoprolol\par Hyperlipidemia- elevated triglycerides, low HDL, c/w Atorvastatin, c/w diet and exercise, patient to try Omega xl, if no improvement, advised to consider Vascepa \par CAD- c/w management as per cardiology\par CKD- stable, monitor, declines nephrology evaluation at this time\par Obesity- c/w diet and exercise as tolerated \par \par return as needed and f/u in 3 months for annual physical

## 2019-09-20 LAB — NONINV COLON CA DNA+OCC BLD SCRN STL QL: NEGATIVE

## 2019-09-30 ENCOUNTER — TRANSCRIPTION ENCOUNTER (OUTPATIENT)
Age: 69
End: 2019-09-30

## 2019-10-01 ENCOUNTER — TRANSCRIPTION ENCOUNTER (OUTPATIENT)
Age: 69
End: 2019-10-01

## 2019-10-16 ENCOUNTER — NON-APPOINTMENT (OUTPATIENT)
Age: 69
End: 2019-10-16

## 2019-10-16 ENCOUNTER — APPOINTMENT (OUTPATIENT)
Dept: CARDIOLOGY | Facility: CLINIC | Age: 69
End: 2019-10-16
Payer: MEDICARE

## 2019-10-16 VITALS
DIASTOLIC BLOOD PRESSURE: 72 MMHG | HEIGHT: 67 IN | OXYGEN SATURATION: 98 % | WEIGHT: 237 LBS | SYSTOLIC BLOOD PRESSURE: 150 MMHG | HEART RATE: 75 BPM | BODY MASS INDEX: 37.2 KG/M2

## 2019-10-16 PROCEDURE — 99214 OFFICE O/P EST MOD 30 MIN: CPT | Mod: 25

## 2019-10-16 PROCEDURE — 93000 ELECTROCARDIOGRAM COMPLETE: CPT

## 2019-10-16 NOTE — REASON FOR VISIT
[Follow-Up - Clinic] : a clinic follow-up of [Chest Pain] : chest pain [Coronary Artery Disease] : coronary artery disease [Hypertension] : hypertension

## 2019-10-16 NOTE — DISCUSSION/SUMMARY
[FreeTextEntry1] : Pt is a 70 y/o F with PMH CAD s/p 3V CABG 6/2016, cardiac cath 05/16/2019 [place] with Dr Gerardo which showed occluded SVG --> RCA now s/p DESx3 to prx and mid RCA, normal LV function, PVD, s/p stent, ex-smoker, HTN, HLD, DM who presents today with exertional chest discomfort\par \par CAD: \par She feels imdur diod not help with symptoms, try CCB\par c/w ASA and plavix, metoprolol\par c/w lipitor 40mg qd - goal LDL 70\par Refer to cardiac rehab\par \par DM: she is compliant with all meds and follows with her PCP\par \par HTN: BP usually well controlled.  Advised low salt diet, regular exercise, weight loss\par \par HLD: c/w statin, advised low chol diet, regular exercise, weight loss\par \par f/u 4 mnths\par \par The described plan was discussed with the pt.  All questions and concerns were addressed to the best of my knowledge.\par \par

## 2019-10-16 NOTE — HISTORY OF PRESENT ILLNESS
[FreeTextEntry1] : Pt is a 70 y/o F who presents today for f/u.  She has PMH CAD s/p \par 3V CABG 07/2016 Dr Cullen at [facility] --> LAD, SVG --> RCA, SVG --> OM (symptoms were LUE tingling) \par PVD s/p LLE stent.  \par cardiac cath 05/16/2019 [place] with Dr Gerardo which showed occluded SVG --> RCA now s/p DESx3 to prx and mid RCA.  \par normal LV function\par ex-smoker, HTN, HLD, DM,\par She is still experiencing exertional CP but symptoms are no consistent and not worsening, no other associated symptoms.  She denies SOB, diaphoresis, palpitations, dizziness, syncope, LE edema, PND. \par \par CUS 2016 showed mild plaque\par CUS 07/2019 CUS mild plaque b/l\par

## 2019-10-16 NOTE — REVIEW OF SYSTEMS
[see HPI] : see HPI [Chest Pain] : chest pain [Negative] : Heme/Lymph [Shortness Of Breath] : no shortness of breath [Dyspnea on exertion] : not dyspnea during exertion [Chest  Pressure] : no chest pressure [Lower Ext Edema] : no extremity edema [Leg Claudication] : no intermittent leg claudication [Palpitations] : no palpitations

## 2019-10-17 ENCOUNTER — OTHER (OUTPATIENT)
Age: 69
End: 2019-10-17

## 2019-10-18 ENCOUNTER — TRANSCRIPTION ENCOUNTER (OUTPATIENT)
Age: 69
End: 2019-10-18

## 2019-10-20 ENCOUNTER — RX RENEWAL (OUTPATIENT)
Age: 69
End: 2019-10-20

## 2019-11-01 ENCOUNTER — RX RENEWAL (OUTPATIENT)
Age: 69
End: 2019-11-01

## 2019-12-13 ENCOUNTER — APPOINTMENT (OUTPATIENT)
Dept: FAMILY MEDICINE | Facility: CLINIC | Age: 69
End: 2019-12-13
Payer: MEDICARE

## 2019-12-13 VITALS
WEIGHT: 248 LBS | HEART RATE: 78 BPM | BODY MASS INDEX: 38.92 KG/M2 | HEIGHT: 67 IN | DIASTOLIC BLOOD PRESSURE: 70 MMHG | SYSTOLIC BLOOD PRESSURE: 146 MMHG | RESPIRATION RATE: 16 BRPM | OXYGEN SATURATION: 97 %

## 2019-12-13 VITALS — DIASTOLIC BLOOD PRESSURE: 60 MMHG | SYSTOLIC BLOOD PRESSURE: 130 MMHG

## 2019-12-13 DIAGNOSIS — E04.1 NONTOXIC SINGLE THYROID NODULE: ICD-10-CM

## 2019-12-13 DIAGNOSIS — Z13.820 ENCOUNTER FOR SCREENING FOR OSTEOPOROSIS: ICD-10-CM

## 2019-12-13 DIAGNOSIS — E66.9 OBESITY, UNSPECIFIED: ICD-10-CM

## 2019-12-13 DIAGNOSIS — N18.3 CHRONIC KIDNEY DISEASE, STAGE 3 (MODERATE): ICD-10-CM

## 2019-12-13 DIAGNOSIS — M54.5 LOW BACK PAIN: ICD-10-CM

## 2019-12-13 DIAGNOSIS — M48.061 SPINAL STENOSIS, LUMBAR REGION WITHOUT NEUROGENIC CLAUDICATION: ICD-10-CM

## 2019-12-13 DIAGNOSIS — Z00.00 ENCOUNTER FOR GENERAL ADULT MEDICAL EXAMINATION W/OUT ABNORMAL FINDINGS: ICD-10-CM

## 2019-12-13 DIAGNOSIS — E11.21 TYPE 2 DIABETES MELLITUS WITH DIABETIC NEPHROPATHY: ICD-10-CM

## 2019-12-13 DIAGNOSIS — G89.29 LOW BACK PAIN: ICD-10-CM

## 2019-12-13 DIAGNOSIS — I73.9 PERIPHERAL VASCULAR DISEASE, UNSPECIFIED: ICD-10-CM

## 2019-12-13 PROCEDURE — 99214 OFFICE O/P EST MOD 30 MIN: CPT | Mod: 25

## 2019-12-13 PROCEDURE — G0439: CPT

## 2019-12-13 RX ORDER — ISOSORBIDE MONONITRATE 30 MG/1
30 TABLET, EXTENDED RELEASE ORAL
Qty: 90 | Refills: 3 | Status: DISCONTINUED | COMMUNITY
Start: 2019-07-17 | End: 2019-12-13

## 2019-12-13 NOTE — ASSESSMENT
[FreeTextEntry1] : Health care maintenance:\par recent blood work results reviewed \par follow up with optometry/ophthalmology and dentist for routine exams when due\par up to date with mammogram\par up to date with FIT DNA testing- negative\par go for bone density\par declines Flu vaccine\par \par DM II with nephropathy:\par controlled\par c/w Glimepiride \par not able to tolerate Lisinopril \par c/w diet and exercise as tolerated\par see in house nutritionist\par see nephrologist\par \par CKD III:\par see nephrologist\par \par HTN:\par stable\par c/w Metoprolol, Amlodipine\par \par Hyperlipidemia:\par elevated triglycerides, otherwise stable\par c/w Atorvastatin\par c/w diet and exercise as tolerated\par see in house nutritionist\par \par CAD:\par c/w management as per cardiology\par \par PVD:\par f/u with vascular surgery\par \par Obesity:\par c/w diet and exercise as tolerated\par see in house nutritionist \par \par Chronic low back pain/lumbar spinal stenosis:\par see physiatrist\par \par Thyroid nodule:\par stable, last ultrasound done March 2019 \par \par return as needed and f/u in 3 months\par

## 2019-12-13 NOTE — HISTORY OF PRESENT ILLNESS
[FreeTextEntry1] : Annual physical  [de-identified] : \par 69 year old female presents for annual physical \par \par Has CAD s/p CABG (3 vessel) in 2016,  had cath angiogram with 3 stents placed in May 2019\par following with cardiology\par \par PVD s/p LLE stent\par \par Has DM II, most recent HgA1C 6.4\par Off of Metformin and Lisinopril as renal function worsened on medications \par On Glimepiride 2 mg daily, doing well on medication\par not able to exercise regularly due to chronic back pain- trying to move and walk as best she can \par \par HTN, on Metoprolol, had Amlodipine \par \par Hyperlipidemia, on Atorvastatin \par tried Omega xl- did not see improvement in triglycerides \par \par H/o Gout, on Allopurinol\par \par up to date with mammogram

## 2019-12-13 NOTE — DATA REVIEWED
[FreeTextEntry1] : Blood work results 12/4/19:\par \par WBC 6.4\par H/H 11.4/35.6\par Platelets 253\par \par Glucose 101\par HgA1C 6.4\par \par BUN 37\par Creatinine 1.19\par eGFR 46\par \par AST 21\par ALT 21\par \par Urinalysis- 1+ protein, Albumin/Creatinine ratio 274.6\par \par Total cholesterol 171\par Triglycerides 330\par HDL 44\par LDL 61\par \par TSH 3.010\par \par Vitamin B12 433\par Folate 16.7\par \par Vitamin D 25-OH 20

## 2019-12-13 NOTE — PHYSICAL EXAM
[No Acute Distress] : no acute distress [Well Nourished] : well nourished [Well-Appearing] : well-appearing [Well Developed] : well developed [PERRL] : pupils equal round and reactive to light [Normal Sclera/Conjunctiva] : normal sclera/conjunctiva [Normal Oropharynx] : the oropharynx was normal [Normal TMs] : both tympanic membranes were normal [Normal Appearance] : was normal in appearance [Neck Supple] : was supple [No Respiratory Distress] : no respiratory distress  [Normal] : the thyroid was normal [Normal Rate] : normal rate  [Clear to Auscultation] : lungs were clear to auscultation bilaterally [Regular Rhythm] : with a regular rhythm [Normal S1, S2] : normal S1 and S2 [No Murmur] : no murmur heard [No Carotid Bruits] : no carotid bruits [Soft] : abdomen soft [No Edema] : there was no peripheral edema [Normal Bowel Sounds] : normal bowel sounds [Obese] : obese [Non Tender] : non-tender [No Rash] : no rash [Normal Anterior Cervical Nodes] : no anterior cervical lymphadenopathy [Normal Posterior Cervical Nodes] : no posterior cervical lymphadenopathy [Normal Affect] : the affect was normal [Normal Mood] : the mood was normal [Alert and Oriented x3] : oriented to person, place, and time [Right Foot Was Examined] : Right foot ~C was examined [None] : no ulcers in either foot were found [Left Foot Was Examined] : left foot ~C was examined [de-identified] : Obese [de-identified] : diminished pedal pulses [de-identified] : ambulates with a cane

## 2019-12-13 NOTE — HEALTH RISK ASSESSMENT
[No] : No [No falls in past year] : Patient reported no falls in the past year [0] : 1) Little interest or pleasure doing things: Not at all (0) [Patient reported mammogram was normal] : Patient reported mammogram was normal [Fully functional (bathing, dressing, toileting, transferring, walking, feeding)] : Fully functional (bathing, dressing, toileting, transferring, walking, feeding) [Some assistance needed] : doing laundry [Independent] : managing finances [Smoke Detector] : smoke detector [Carbon Monoxide Detector] : carbon monoxide detector [Seat Belt] :  uses seat belt [Patient/Caregiver not ready to engage] : Patient/Caregiver not ready to engage [de-identified] : former smoker- quit 30 years ago  [de-identified] : not exercising regularly- difficult due to back and joint pains [de-identified] : diet is generally good  [Change in mental status noted] : No change in mental status noted [Reports changes in hearing] : Reports no changes in hearing [Reports changes in vision] : Reports no changes in vision [MammogramDate] : 03/19 [ColonoscopyComments] : F [AdvancecareDate] : 12/19

## 2020-01-03 ENCOUNTER — TRANSCRIPTION ENCOUNTER (OUTPATIENT)
Age: 70
End: 2020-01-03

## 2020-01-03 ENCOUNTER — OTHER (OUTPATIENT)
Age: 70
End: 2020-01-03

## 2020-01-06 ENCOUNTER — TRANSCRIPTION ENCOUNTER (OUTPATIENT)
Age: 70
End: 2020-01-06

## 2020-01-20 ENCOUNTER — RX RENEWAL (OUTPATIENT)
Age: 70
End: 2020-01-20

## 2020-01-21 ENCOUNTER — TRANSCRIPTION ENCOUNTER (OUTPATIENT)
Age: 70
End: 2020-01-21

## 2020-01-23 ENCOUNTER — TRANSCRIPTION ENCOUNTER (OUTPATIENT)
Age: 70
End: 2020-01-23

## 2020-02-05 ENCOUNTER — TRANSCRIPTION ENCOUNTER (OUTPATIENT)
Age: 70
End: 2020-02-05

## 2020-02-06 ENCOUNTER — TRANSCRIPTION ENCOUNTER (OUTPATIENT)
Age: 70
End: 2020-02-06

## 2020-02-07 ENCOUNTER — TRANSCRIPTION ENCOUNTER (OUTPATIENT)
Age: 70
End: 2020-02-07

## 2020-02-07 RX ORDER — METOPROLOL SUCCINATE 100 MG/1
100 TABLET, EXTENDED RELEASE ORAL DAILY
Qty: 90 | Refills: 1 | Status: ACTIVE | COMMUNITY
Start: 2017-08-02 | End: 1900-01-01

## 2020-02-10 ENCOUNTER — TRANSCRIPTION ENCOUNTER (OUTPATIENT)
Age: 70
End: 2020-02-10

## 2020-02-11 ENCOUNTER — NON-APPOINTMENT (OUTPATIENT)
Age: 70
End: 2020-02-11

## 2020-02-11 ENCOUNTER — APPOINTMENT (OUTPATIENT)
Dept: CARDIOLOGY | Facility: CLINIC | Age: 70
End: 2020-02-11
Payer: MEDICARE

## 2020-02-11 VITALS
OXYGEN SATURATION: 99 % | WEIGHT: 248 LBS | BODY MASS INDEX: 38.92 KG/M2 | HEIGHT: 67 IN | SYSTOLIC BLOOD PRESSURE: 122 MMHG | DIASTOLIC BLOOD PRESSURE: 80 MMHG | HEART RATE: 77 BPM

## 2020-02-11 DIAGNOSIS — I10 ESSENTIAL (PRIMARY) HYPERTENSION: ICD-10-CM

## 2020-02-11 DIAGNOSIS — E78.5 HYPERLIPIDEMIA, UNSPECIFIED: ICD-10-CM

## 2020-02-11 DIAGNOSIS — R07.89 OTHER CHEST PAIN: ICD-10-CM

## 2020-02-11 DIAGNOSIS — I25.10 ATHEROSCLEROTIC HEART DISEASE OF NATIVE CORONARY ARTERY W/OUT ANGINA PECTORIS: ICD-10-CM

## 2020-02-11 PROCEDURE — 99214 OFFICE O/P EST MOD 30 MIN: CPT | Mod: 25

## 2020-02-11 PROCEDURE — 93000 ELECTROCARDIOGRAM COMPLETE: CPT

## 2020-02-11 RX ORDER — AMLODIPINE BESYLATE 5 MG/1
5 TABLET ORAL DAILY
Qty: 90 | Refills: 1 | Status: DISCONTINUED | COMMUNITY
Start: 2019-10-18 | End: 2020-02-11

## 2020-02-13 ENCOUNTER — TRANSCRIPTION ENCOUNTER (OUTPATIENT)
Age: 70
End: 2020-02-13

## 2020-02-15 PROBLEM — I10 HYPERTENSION: Status: ACTIVE | Noted: 2017-08-11

## 2020-02-15 PROBLEM — E78.5 HYPERLIPIDEMIA: Status: ACTIVE | Noted: 2017-08-11

## 2020-02-15 PROBLEM — R07.89 CHEST DISCOMFORT: Status: ACTIVE | Noted: 2019-05-07

## 2020-02-15 PROBLEM — I25.10 CAD (CORONARY ARTERY DISEASE): Status: ACTIVE | Noted: 2017-08-11

## 2020-02-15 NOTE — PHYSICAL EXAM
[Normal Appearance] : normal appearance [General Appearance - Well Developed] : well developed [Well Groomed] : well groomed [General Appearance - Well Nourished] : well nourished [No Deformities] : no deformities [General Appearance - In No Acute Distress] : no acute distress [Normal Conjunctiva] : the conjunctiva exhibited no abnormalities [Eyelids - No Xanthelasma] : the eyelids demonstrated no xanthelasmas [Normal Oral Mucosa] : normal oral mucosa [No Oral Pallor] : no oral pallor [No Oral Cyanosis] : no oral cyanosis [Respiration, Rhythm And Depth] : normal respiratory rhythm and effort [Exaggerated Use Of Accessory Muscles For Inspiration] : no accessory muscle use [Auscultation Breath Sounds / Voice Sounds] : lungs were clear to auscultation bilaterally [Heart Rate And Rhythm] : heart rate and rhythm were normal [Murmurs] : no murmurs present [Heart Sounds] : normal S1 and S2 [Arterial Pulses Normal] : the arterial pulses were normal [Edema] : no peripheral edema present [Abdomen Soft] : soft [Abdomen Tenderness] : non-tender [Abdomen Mass (___ Cm)] : no abdominal mass palpated [Abnormal Walk] : normal gait [Gait - Sufficient For Exercise Testing] : the gait was sufficient for exercise testing [Nail Clubbing] : no clubbing of the fingernails [Petechial Hemorrhages (___cm)] : no petechial hemorrhages [Cyanosis, Localized] : no localized cyanosis [] : no rash [Skin Color & Pigmentation] : normal skin color and pigmentation [No Venous Stasis] : no venous stasis [Skin Lesions] : no skin lesions [No Skin Ulcers] : no skin ulcer [No Xanthoma] : no  xanthoma was observed [Oriented To Time, Place, And Person] : oriented to person, place, and time [Impaired Insight] : insight and judgment were intact [Affect] : the affect was normal [Mood] : the mood was normal [No Anxiety] : not feeling anxious [FreeTextEntry1] : L radial with healing ecchymosis, no hematoma, good prox and distal pulses

## 2020-02-15 NOTE — HISTORY OF PRESENT ILLNESS
[FreeTextEntry1] : Pt is a 70 y/o F who presents today for f/u.  She has PMH CAD s/p \par 3V CABG 07/2016 Dr Cullen at [facility] --> LAD, SVG --> RCA, SVG --> OM (symptoms were LUE tingling) \par PVD s/p LLE stent.  vascular Dr Caceres\par cardiac cath 05/16/2019 [place] with Dr Gerardo which showed occluded SVG --> RCA now s/p DESx3 to prx and mid RCA.  \par normal LV function\par ex-smoker, HTN, HLD, DM,\par She is still experiencing exertional CP but notices now that it may be related with po intake.  She denies SOB, diaphoresis, palpitations, dizziness, syncope, LE edema, PND. \par \par CUS 2016 showed mild plaque\par CUS 07/2019 CUS mild plaque b/l\par

## 2020-02-15 NOTE — REVIEW OF SYSTEMS
[see HPI] : see HPI [Chest Pain] : chest pain [Negative] : Endocrine [Shortness Of Breath] : no shortness of breath [Dyspnea on exertion] : not dyspnea during exertion [Chest  Pressure] : no chest pressure [Lower Ext Edema] : no extremity edema [Leg Claudication] : no intermittent leg claudication [Palpitations] : no palpitations

## 2020-02-15 NOTE — DISCUSSION/SUMMARY
[FreeTextEntry1] : Pt is a 68 y/o F with PMH CAD s/p 3V CABG 6/2016, cardiac cath 05/16/2019 [place] with Dr Gerardo which showed occluded SVG --> RCA now s/p DESx3 to prx and mid RCA, normal LV function, PVD, s/p stent, ex-smoker, HTN, HLD, DM who presents today with exertional chest discomfort which may be due to GI - try PPI\par Advised pt to go to the nearest ED if symptoms persist or worsen \par \par CAD: \par She feels imdur diod not help with symptoms, try CCB\par c/w ASA and plavix, metoprolol\par c/w lipitor 40mg qd - goal LDL 70\par Refer to cardiac rehab\par \par DM: she is compliant with all meds and follows with her PCP\par \par HTN: BP usually well controlled.  Advised low salt diet, regular exercise, weight loss\par \par HLD: c/w statin, advised low chol diet, regular exercise, weight loss\par \par f/u 4 mnths\par \par The described plan was discussed with the pt.  All questions and concerns were addressed to the best of my knowledge.\par \par

## 2020-03-02 ENCOUNTER — TRANSCRIPTION ENCOUNTER (OUTPATIENT)
Age: 70
End: 2020-03-02

## 2020-03-03 ENCOUNTER — TRANSCRIPTION ENCOUNTER (OUTPATIENT)
Age: 70
End: 2020-03-03

## 2020-03-04 ENCOUNTER — TRANSCRIPTION ENCOUNTER (OUTPATIENT)
Age: 70
End: 2020-03-04

## 2020-03-11 ENCOUNTER — RX RENEWAL (OUTPATIENT)
Age: 70
End: 2020-03-11

## 2020-03-11 RX ORDER — ALLOPURINOL 100 MG/1
100 TABLET ORAL
Qty: 90 | Refills: 1 | Status: ACTIVE | COMMUNITY
Start: 2017-11-10 | End: 1900-01-01

## 2020-03-12 RX ORDER — GLIMEPIRIDE 2 MG/1
2 TABLET ORAL DAILY
Qty: 90 | Refills: 0 | Status: ACTIVE | COMMUNITY
Start: 2018-12-21 | End: 1900-01-01

## 2020-03-16 ENCOUNTER — RX RENEWAL (OUTPATIENT)
Age: 70
End: 2020-03-16

## 2020-03-16 RX ORDER — ATORVASTATIN CALCIUM 40 MG/1
40 TABLET, FILM COATED ORAL
Qty: 90 | Refills: 0 | Status: ACTIVE | COMMUNITY
Start: 2020-03-16 | End: 1900-01-01

## 2020-03-17 ENCOUNTER — TRANSCRIPTION ENCOUNTER (OUTPATIENT)
Age: 70
End: 2020-03-17

## 2020-03-24 ENCOUNTER — NON-APPOINTMENT (OUTPATIENT)
Age: 70
End: 2020-03-24

## 2020-04-06 ENCOUNTER — TRANSCRIPTION ENCOUNTER (OUTPATIENT)
Age: 70
End: 2020-04-06

## 2020-06-03 ENCOUNTER — APPOINTMENT (OUTPATIENT)
Dept: FAMILY MEDICINE | Facility: CLINIC | Age: 70
End: 2020-06-03

## 2020-08-12 ENCOUNTER — APPOINTMENT (OUTPATIENT)
Dept: CARDIOLOGY | Facility: CLINIC | Age: 70
End: 2020-08-12

## 2025-02-27 NOTE — ASSESSMENT
[FreeTextEntry1] : DM II: \par stable\par c/w Metformin\par c/w diet and exercise as tolerated\par check blood work in a few weeks\par trial of Lisinopril as patient unable to tolerate Losartan\par \par CKD: \par improved\par c/w water hydration\par monitor\par \par HTN:\par stable\par c/w Metoprolol\par trial of Lisinopril \par \par Hyperlipidemia:\par stable\par c/w Atorvastatin\par c/w diet and exercise as tolerated\par \par Obesity:\par c/w diet and exercise as tolerated 27-Feb-2025 19:16